# Patient Record
Sex: MALE | Race: OTHER | HISPANIC OR LATINO | Employment: UNEMPLOYED | URBAN - METROPOLITAN AREA
[De-identification: names, ages, dates, MRNs, and addresses within clinical notes are randomized per-mention and may not be internally consistent; named-entity substitution may affect disease eponyms.]

---

## 2018-10-16 ENCOUNTER — HOSPITAL ENCOUNTER (EMERGENCY)
Facility: HOSPITAL | Age: 33
Discharge: HOME | End: 2018-10-16
Attending: EMERGENCY MEDICINE | Admitting: EMERGENCY MEDICINE

## 2018-10-16 ENCOUNTER — APPOINTMENT (EMERGENCY)
Dept: RADIOLOGY | Facility: HOSPITAL | Age: 33
End: 2018-10-16

## 2018-10-16 VITALS
DIASTOLIC BLOOD PRESSURE: 65 MMHG | OXYGEN SATURATION: 99 % | TEMPERATURE: 98.1 F | WEIGHT: 278 LBS | RESPIRATION RATE: 22 BRPM | SYSTOLIC BLOOD PRESSURE: 144 MMHG | BODY MASS INDEX: 42.13 KG/M2 | HEART RATE: 91 BPM | HEIGHT: 68 IN

## 2018-10-16 DIAGNOSIS — R07.89 CHEST WALL PAIN: ICD-10-CM

## 2018-10-16 DIAGNOSIS — J40 BRONCHITIS: Primary | ICD-10-CM

## 2018-10-16 DIAGNOSIS — J45.909 ASTHMA: ICD-10-CM

## 2018-10-16 LAB
ALBUMIN SERPL BCP-MCNC: 4.4 G/DL (ref 3–5.2)
ALP SERPL-CCNC: 110 U/L (ref 43–122)
ALT SERPL W P-5'-P-CCNC: 31 U/L (ref 9–52)
ANION GAP SERPL CALCULATED.3IONS-SCNC: 7 MMOL/L (ref 5–14)
AST SERPL W P-5'-P-CCNC: 25 U/L (ref 17–59)
BASOPHILS # BLD AUTO: 0.11 THOUSAND/UL (ref 0–0.1)
BASOPHILS NFR MAR MANUAL: 1 % (ref 0–1)
BILIRUB SERPL-MCNC: 0.6 MG/DL
BUN SERPL-MCNC: 13 MG/DL (ref 5–25)
CALCIUM SERPL-MCNC: 9.3 MG/DL (ref 8.4–10.2)
CHLORIDE SERPL-SCNC: 105 MMOL/L (ref 97–108)
CO2 SERPL-SCNC: 26 MMOL/L (ref 22–30)
CREAT SERPL-MCNC: 0.99 MG/DL (ref 0.7–1.5)
EOSINOPHIL # BLD AUTO: 0.11 THOUSAND/UL (ref 0–0.4)
EOSINOPHIL NFR BLD MANUAL: 1 % (ref 0–6)
ERYTHROCYTE [DISTWIDTH] IN BLOOD BY AUTOMATED COUNT: 13.6 %
GFR SERPL CREATININE-BSD FRML MDRD: 99 ML/MIN/1.73SQ M
GLUCOSE SERPL-MCNC: 93 MG/DL (ref 70–99)
HCT VFR BLD AUTO: 44.8 % (ref 41–53)
HGB BLD-MCNC: 14.9 G/DL (ref 13.5–17.5)
LIPASE SERPL-CCNC: 58 U/L (ref 23–300)
LYMPHOCYTES # BLD AUTO: 3.89 THOUSAND/UL (ref 0.5–4)
LYMPHOCYTES # BLD AUTO: 35 % (ref 20–50)
MCH RBC QN AUTO: 28 PG (ref 26–34)
MCHC RBC AUTO-ENTMCNC: 33.2 G/DL (ref 31–36)
MCV RBC AUTO: 84 FL (ref 80–100)
MONOCYTES # BLD AUTO: 0.33 THOUSAND/UL (ref 0.2–0.9)
MONOCYTES NFR BLD AUTO: 3 % (ref 1–10)
NEUTS BAND NFR BLD MANUAL: 3 % (ref 0–8)
NEUTS SEG # BLD: 6.66 THOUSAND/UL (ref 1.8–7.8)
NEUTS SEG NFR BLD AUTO: 57 %
PLATELET # BLD AUTO: 235 THOUSANDS/UL (ref 150–450)
PLATELET BLD QL SMEAR: ADEQUATE
PMV BLD AUTO: 9.3 FL (ref 8.9–12.7)
POTASSIUM SERPL-SCNC: 4.4 MMOL/L (ref 3.6–5)
PROT SERPL-MCNC: 7.7 G/DL (ref 5.9–8.4)
RBC # BLD AUTO: 5.31 MILLION/UL (ref 4.5–5.9)
RBC MORPH BLD: NORMAL
SODIUM SERPL-SCNC: 138 MMOL/L (ref 137–147)
TOTAL CELLS COUNTED SPEC: 100
TROPONIN I SERPL-MCNC: <0.01 NG/ML (ref 0–0.03)
WBC # BLD AUTO: 11.1 THOUSAND/UL (ref 4.5–11)

## 2018-10-16 PROCEDURE — 84484 ASSAY OF TROPONIN QUANT: CPT | Performed by: PHYSICIAN ASSISTANT

## 2018-10-16 PROCEDURE — 94640 AIRWAY INHALATION TREATMENT: CPT

## 2018-10-16 PROCEDURE — 36415 COLL VENOUS BLD VENIPUNCTURE: CPT | Performed by: PHYSICIAN ASSISTANT

## 2018-10-16 PROCEDURE — 96361 HYDRATE IV INFUSION ADD-ON: CPT

## 2018-10-16 PROCEDURE — 96374 THER/PROPH/DIAG INJ IV PUSH: CPT

## 2018-10-16 PROCEDURE — 99285 EMERGENCY DEPT VISIT HI MDM: CPT

## 2018-10-16 PROCEDURE — 85007 BL SMEAR W/DIFF WBC COUNT: CPT | Performed by: PHYSICIAN ASSISTANT

## 2018-10-16 PROCEDURE — 80053 COMPREHEN METABOLIC PANEL: CPT | Performed by: PHYSICIAN ASSISTANT

## 2018-10-16 PROCEDURE — 83690 ASSAY OF LIPASE: CPT | Performed by: PHYSICIAN ASSISTANT

## 2018-10-16 PROCEDURE — 71045 X-RAY EXAM CHEST 1 VIEW: CPT

## 2018-10-16 PROCEDURE — 93005 ELECTROCARDIOGRAM TRACING: CPT

## 2018-10-16 PROCEDURE — 85027 COMPLETE CBC AUTOMATED: CPT | Performed by: PHYSICIAN ASSISTANT

## 2018-10-16 RX ORDER — BENZONATATE 100 MG/1
100 CAPSULE ORAL EVERY 8 HOURS
Qty: 21 CAPSULE | Refills: 0 | Status: SHIPPED | OUTPATIENT
Start: 2018-10-16 | End: 2019-01-21

## 2018-10-16 RX ORDER — ACETAMINOPHEN 325 MG/1
TABLET ORAL
Status: COMPLETED
Start: 2018-10-16 | End: 2018-10-16

## 2018-10-16 RX ORDER — ALBUTEROL SULFATE 2.5 MG/3ML
2.5 SOLUTION RESPIRATORY (INHALATION) ONCE
Status: DISCONTINUED | OUTPATIENT
Start: 2018-10-16 | End: 2018-10-16 | Stop reason: HOSPADM

## 2018-10-16 RX ORDER — ALBUTEROL SULFATE 90 UG/1
2 AEROSOL, METERED RESPIRATORY (INHALATION) EVERY 4 HOURS PRN
Qty: 1 INHALER | Refills: 0 | Status: SHIPPED | OUTPATIENT
Start: 2018-10-16

## 2018-10-16 RX ORDER — IBUPROFEN 600 MG/1
600 TABLET ORAL EVERY 6 HOURS PRN
Qty: 30 TABLET | Refills: 0 | Status: SHIPPED | OUTPATIENT
Start: 2018-10-16 | End: 2019-01-21

## 2018-10-16 RX ORDER — SODIUM CHLORIDE 9 MG/ML
250 INJECTION, SOLUTION INTRAVENOUS CONTINUOUS
Status: DISCONTINUED | OUTPATIENT
Start: 2018-10-16 | End: 2018-10-16 | Stop reason: HOSPADM

## 2018-10-16 RX ORDER — ALBUTEROL SULFATE 2.5 MG/3ML
2.5 SOLUTION RESPIRATORY (INHALATION) EVERY 6 HOURS PRN
Qty: 75 ML | Refills: 0 | Status: SHIPPED | OUTPATIENT
Start: 2018-10-16 | End: 2019-01-21

## 2018-10-16 RX ORDER — KETOROLAC TROMETHAMINE 30 MG/ML
30 INJECTION, SOLUTION INTRAMUSCULAR; INTRAVENOUS ONCE
Status: COMPLETED | OUTPATIENT
Start: 2018-10-16 | End: 2018-10-16

## 2018-10-16 RX ORDER — AZITHROMYCIN 250 MG/1
TABLET, FILM COATED ORAL
Qty: 6 TABLET | Refills: 0 | Status: SHIPPED | OUTPATIENT
Start: 2018-10-16 | End: 2018-10-20

## 2018-10-16 RX ORDER — ACETAMINOPHEN 325 MG/1
650 TABLET ORAL ONCE
Status: COMPLETED | OUTPATIENT
Start: 2018-10-16 | End: 2018-10-16

## 2018-10-16 RX ORDER — KETOROLAC TROMETHAMINE 30 MG/ML
INJECTION, SOLUTION INTRAMUSCULAR; INTRAVENOUS
Status: COMPLETED
Start: 2018-10-16 | End: 2018-10-16

## 2018-10-16 RX ADMIN — KETOROLAC TROMETHAMINE 30 MG: 30 INJECTION, SOLUTION INTRAMUSCULAR; INTRAVENOUS at 19:44

## 2018-10-16 RX ADMIN — ALBUTEROL SULFATE 2.5 MG: 2.5 SOLUTION RESPIRATORY (INHALATION) at 19:44

## 2018-10-16 RX ADMIN — ACETAMINOPHEN 650 MG: 325 TABLET ORAL at 19:44

## 2018-10-16 RX ADMIN — ALBUTEROL SULFATE 2.5 MG: 2.5 SOLUTION RESPIRATORY (INHALATION) at 20:24

## 2018-10-16 RX ADMIN — Medication 0.5 MG: at 19:44

## 2018-10-16 RX ADMIN — SODIUM CHLORIDE 250 ML/HR: 9 INJECTION, SOLUTION INTRAVENOUS at 19:43

## 2018-10-16 RX ADMIN — IPRATROPIUM BROMIDE 0.5 MG: 0.5 SOLUTION RESPIRATORY (INHALATION) at 19:44

## 2018-10-16 NOTE — ED PROVIDER NOTES
History  Chief Complaint   Patient presents with    Chest Pain     started today  pt c/o pain started midday and cough 2 hours ago  pt states he feels like he is starting to get congested  pt c/o SOB  Pain does not radiate  pt states he has had this pain before but had no problems with his heart       History provided by:  Patient   used: Yes    Medical Problem   Location:  Pt with cough and congestion and central chest pain since last josette   Severity:  Moderate  Onset quality:  Gradual  Duration:  1 day  Timing:  Constant  Progression:  Unchanged  Chronicity:  New  Associated symptoms: chest pain, congestion and cough    Associated symptoms: no abdominal pain, no diarrhea, no fatigue, no fever, no headaches, no loss of consciousness, no myalgias, no nausea, no rash, no rhinorrhea, no shortness of breath, no sore throat, no vomiting and no wheezing        None       History reviewed  No pertinent past medical history  Past Surgical History:   Procedure Laterality Date    APPENDECTOMY         History reviewed  No pertinent family history  I have reviewed and agree with the history as documented  Social History   Substance Use Topics    Smoking status: Never Smoker    Smokeless tobacco: Never Used    Alcohol use No        Review of Systems   Constitutional: Negative  Negative for fatigue and fever  HENT: Positive for congestion  Negative for rhinorrhea and sore throat  Eyes: Negative  Respiratory: Positive for cough  Negative for shortness of breath and wheezing  Cardiovascular: Positive for chest pain  Gastrointestinal: Negative  Negative for abdominal pain, diarrhea, nausea and vomiting  Endocrine: Negative  Genitourinary: Negative  Musculoskeletal: Negative  Negative for myalgias  Skin: Negative  Negative for rash  Allergic/Immunologic: Negative  Neurological: Negative  Negative for loss of consciousness and headaches  Hematological: Negative  Psychiatric/Behavioral: Negative  All other systems reviewed and are negative  Physical Exam  Physical Exam   Constitutional: He is oriented to person, place, and time  He appears well-developed and well-nourished  900pm  Pt states he feels much better  Increase airway cta no rrw    HENT:   Head: Normocephalic and atraumatic  Right Ear: External ear normal    Left Ear: External ear normal    Nose: Nose normal    Mouth/Throat: Oropharynx is clear and moist    Eyes: Pupils are equal, round, and reactive to light  Conjunctivae and EOM are normal    Neck: Normal range of motion  Neck supple  Cardiovascular: Normal rate, regular rhythm and normal heart sounds  Pulmonary/Chest: Effort normal    Decreased breath sounds  Sternal and parasternal tender to palp    Abdominal: Soft  Bowel sounds are normal    Musculoskeletal: Normal range of motion  Neurological: He is alert and oriented to person, place, and time  Skin: Skin is warm  Psychiatric: He has a normal mood and affect  His behavior is normal    Nursing note and vitals reviewed        Vital Signs  ED Triage Vitals [10/16/18 1914]   Temperature Pulse Respirations Blood Pressure SpO2   98 1 °F (36 7 °C) 91 22 144/65 99 %      Temp Source Heart Rate Source Patient Position - Orthostatic VS BP Location FiO2 (%)   Tympanic Monitor Sitting Left arm --      Pain Score       8           Vitals:    10/16/18 1914   BP: 144/65   Pulse: 91   Patient Position - Orthostatic VS: Sitting       Visual Acuity      ED Medications  Medications   sodium chloride 0 9 % infusion (0 mL/hr Intravenous Stopped 10/16/18 2036)   albuterol inhalation solution 2 5 mg (not administered)   albuterol inhalation solution 2 5 mg (not administered)   ipratropium (ATROVENT) 0 02 % inhalation solution 0 5 mg (0 5 mg Nebulization Given 10/16/18 1944)   ketorolac (TORADOL) injection 30 mg (30 mg Intravenous Given 10/16/18 1944)   acetaminophen (TYLENOL) tablet 650 mg (650 mg Oral Given 10/16/18 1944)   albuterol (5 mg/mL) 0 5 % inhalation solution **ADS Override Pull** (2 5 mg  Given 10/16/18 1944)   albuterol (5 mg/mL) 0 5 % inhalation solution **ADS Override Pull** (2 5 mg  Given 10/16/18 2024)       Diagnostic Studies  Results Reviewed     Procedure Component Value Units Date/Time    Troponin I [[id number]]  (Normal) Collected:  10/16/18 1944    Lab Status:  Final result Specimen:  Blood from Arm, Right Updated:  10/16/18 2029     Troponin I <0 01 ng/mL     Lipase [[id number]]  (Normal) Collected:  10/16/18 1944    Lab Status:  Final result Specimen:  Blood from Arm, Right Updated:  10/16/18 2018     Lipase 58 u/L     Comprehensive metabolic panel [[id number]]  (Normal) Collected:  10/16/18 1944    Lab Status:  Final result Specimen:  Blood from Arm, Right Updated:  10/16/18 2018     Sodium 138 mmol/L      Potassium 4 4 mmol/L      Chloride 105 mmol/L      CO2 26 mmol/L      ANION GAP 7 mmol/L      BUN 13 mg/dL      Creatinine 0 99 mg/dL      Glucose 93 mg/dL      Calcium 9 3 mg/dL      AST 25 U/L      ALT 31 U/L      Alkaline Phosphatase 110 U/L      Total Protein 7 7 g/dL      Albumin 4 4 g/dL      Total Bilirubin 0 60 mg/dL      eGFR 99 ml/min/1 73sq m     Narrative:         National Kidney Disease Education Program recommendations are as follows:  GFR calculation is accurate only with a steady state creatinine  Chronic Kidney disease less than 60 ml/min/1 73 sq  meters  Kidney failure less than 15 ml/min/1 73 sq  meters      CBC and differential [[id number]]  (Abnormal) Collected:  10/16/18 1944    Lab Status:  Final result Specimen:  Blood from Arm, Right Updated:  10/16/18 2009     WBC 11 10 (H) Thousand/uL      RBC 5 31 Million/uL      Hemoglobin 14 9 g/dL      Hematocrit 44 8 %      MCV 84 fL      MCH 28 0 pg      MCHC 33 2 g/dL      RDW 13 6 %      MPV 9 3 fL      Platelets 611 Thousands/uL                  XR chest 1 view portable    (Results Pending) Procedures  Procedures       Phone Contacts  ED Phone Contact    ED Course                               MDM  CritCare Time    Disposition  Final diagnoses:   Bronchitis   Asthma   Chest wall pain     Time reflects when diagnosis was documented in both MDM as applicable and the Disposition within this note     Time User Action Codes Description Comment    10/16/2018  9:04 PM Nuno Cruzyr Add [J40] Bronchitis     10/16/2018  9:04 PM Nuno Green Add [J45 909] Asthma     10/16/2018  9:04 PM Sarthak Monzon Add [R07 89] Chest wall pain       ED Disposition     ED Disposition Condition Comment    Discharge  [facility] discharge to home/self care      Condition at discharge: Good        Follow-up Information     Follow up With Specialties Details Why Contact Info Additional [address] [facility] Schedule an appointment as soon as possible for a visit  [address] [facility] [address] [phone] [address] [place] [address] [address], [address] [place], [place], [address]          Discharge Medication List as of 10/16/2018  9:06 PM      START taking these medications    Details   albuterol (2 5 mg/3 mL) 0 083 % nebulizer solution Take 1 vial (2 5 mg total) by nebulization every 6 (six) hours as needed for wheezing or shortness of breath, Starting Tue 10/16/2018, Print      albuterol (PROVENTIL HFA,VENTOLIN HFA) 90 mcg/act inhaler Inhale 2 puffs every 4 (four) hours as needed for wheezing, Starting Tue 10/16/2018, Print      azithromycin (ZITHROMAX Z-MONET) 250 mg tablet Take 2 tablets today then 1 tablet daily x 4 days, Print      benzonatate (TESSALON PERLES) 100 mg capsule Take 1 capsule (100 mg total) by mouth every 8 (eight) hours, Starting Tue 10/16/2018, Print      ibuprofen (MOTRIN) 600 mg tablet Take 1 tablet (600 mg total) by mouth every 6 (six) hours as needed (pain), Starting Tue 10/16/2018, Print           No discharge procedures on file      ED Provider  Electronically Signed by           Isela Garcia PA-C  10/16/18 8725

## 2018-10-17 LAB
ATRIAL RATE: 79 BPM
P AXIS: 61 DEGREES
PR INTERVAL: 142 MS
QRS AXIS: 6 DEGREES
QRSD INTERVAL: 88 MS
QT INTERVAL: 322 MS
QTC INTERVAL: 369 MS
T WAVE AXIS: 45 DEGREES
VENTRICULAR RATE: 79 BPM

## 2018-10-17 PROCEDURE — 93010 ELECTROCARDIOGRAM REPORT: CPT | Performed by: INTERNAL MEDICINE

## 2018-10-17 NOTE — DISCHARGE INSTRUCTIONS
Asma, cuidados ambulatorios   INFORMACIÓN GENERAL:   El asma  es joaquim enfermedad pulmonar que dificulta la respiración  La inflamación crónica y las reacciones a los factores desencadenantes estrechan las vías respiratorias en edgar pulmones  El asma puede ser mortal si no se controla  Los síntomas comunes incluyen los siguientes:   · Tos     · Sibilancias     · Falta de aliento     · Opresión en el pecho  Busque cuidados inmediatos para los siguientes síntomas:   · Falta de aliento severa    · Labios o uñas azules o grises    · La piel alrededor de zaidi samanta y costillas se hunde con cada respiro    · Falta de [place], aún después de tomarse edgar medicamentos de uso a corto plazo según edgar indicaciones    · Las lecturas numéricas del medidor de zaidi flujo bharat están en la saloni cindy de zaidi plan de acción para el asma  El tratamiento para el asma  dependerá de zaidi severidad  Es posible que los medicamentos disminuyan la inflamación, dana las vías respiratorias y faciliten zaidi respiración  Los medicamentos pueden inhalarse, tomarse en pastilla o [place]  Los [place] de uso a corto plazo alivian edgar síntomas rápidamente  Los medicamentos a kyle plazo se usan para prevenir ataques futuros  Puede ser que usted también necesite medicamentos para ayudar a controlar edgar alergias  Beryl Evangelist y prevenga futuros ataques de asma:   · Siga zaidi plan de acción para el asma  Terri es un plan escrito que usted y zaidi médico Prat[place]ha   Terri explica cual medicamento usted necesita y si es necesario, y cuando cambiar la dosis  También explica cómo controlar los síntomas y utilizar un medidor de flujo bharat (alto)  El medidor mide qué tan manuelito están funcionando los pulmones  · [address] , janet las [place], el reflujo estomacal y la apnea de sueño  · Identifique y evite los factores desencadenantes  Estos pueden [employer], los ácaros del [place], el moho y las cucarachas       · No fume y evite a los fumadores  Si usted fuma, nunca es tarde para dejar de hacerlo  Consulte con zaidi médico si necesita ayuda para dejar de fumar  · Consulte sobre la vacuna contra la gripe  La gripe puede empeorar zaidi asma  Es posible que necesite de joaquim vacuna contra la gripe cada año  Programe joaquim des de seguimiento con zaidi proveedor de kate según indicaciones:  Usted tendrá que regresar para asegurarse de que zaidi medicamento esté funcionando y edgar síntomas están controlados  Es probable que a usted lo refieran a un especialista en asma o alergias  Seguramente le pedirán que anote los valores numéricos de zaidi medidor del flujo bharat para que los lleve a edgar citas de seguimiento  Anote edgar preguntas para que las recuerde gustabo edgar citas de seguimiento  ACUERDOS SOBRE ZAIDI CUIDADO:   Usted tiene el derecho de participar en la planificación de zaidi cuidado  Aprenda todo lo que pueda sobre zaidi condición y janet darle tratamiento  Discuta con edgar médicos edgar opciones de tratamiento para juntos decidir el cuidado que usted quiere recibir  Usted siempre tiene el derecho a rechazar zaidi tratamiento  Esta información es sólo para uso en educación  Zaidi intención no es darle un consejo médico sobre enfermedades o tratamientos  Colsulte con zaidi Burlene Rison farmacéutico antes de seguir cualquier régimen médico para saber si es seguro y efectivo para usted  © 2014 [address] is for End User's use only and may not be sold, redistributed or otherwise used for commercial purposes  All illustrations and images included in CareNotes® are the copyrighted property of A D A M , Inc  or Zev Rivera  Bronquitis aguda   CUIDADO AMBULATORIO:   La bronquitis aguda  es la inflamación e irritación de edgar vías respiratorias  Esta irritación puede provocar que tosa o que tenga otros problemas de respiración  La bronquitis aguda suele comenzar debido a otra enfermedad, janet un resfriado o la gripe  La enfermedad se propaga de means nariz y garganta a means tráquea y Megne November  La bronquitis a menudo es conocida janet resfriado de pecho  La bronquitis aguda generalmente dura alrededor de 3 a 6 semanas y no es joaquim enfermedad grave  La tos podría durar por varias semanas  Usted podría presentar cualquiera de los siguientes síntomas:   · Tos con esputo que podría ser Chucho Hansa, Mylinda Battiest o alice    · Sentirse más cansado de lo normal y yon corporales    · [place] y escalofríos    · Resuello cuando respira    · Opresión en el pecho o dolor cuando respira o tose  Busque atención médica de inmediato si:   · Usted expectora ann  · Edgar labios o uñas de los dedos se ponen azules  · Usted siente que no está recibiendo suficiente aire cuando respira  Pregúntele a means Sheeba Boateng vitaminas y minerales son adecuados para usted  · Usted tiene fiebre  · Edgar problemas respiratorios no desaparecen o empeoran  · Means tos no mejora dentro de 4 semanas  · Usted tiene preguntas o inquietudes acerca de means condición o cuidado  Cuidados personales:   · Descanse más  El descanso ayuda a means cuerpo a sanar  Empiece a hacer un poco más día a día  Descanse cuando usted sienta que es necesario  · Evite irritantes en el aire  Evite productos químicos, gases y polvo  Use joaquim mascarilla si tiene que trabajar alrededor de polvo o gases  Permanezca dentro cuando los niveles de contaminación hetal altos  Si tiene alergias, permanezca adentro cuando el conteo de polen sea [place]AN[place]  No use productos en aerosol, janet desodorante, aerosol contra los insectos y aerosol para el yolis  · No fume o esté alrededor de personas que fuman  La nicotina y otros químicos en los cigarrillos y cigarros dañan la cilia que saca la mucosidad de edgar pulmones  Pida información a means médico si usted actualmente fuma y necesita ayuda para dejar de fumar  Los cigarrillos electrónicos o tabaco sin humo todavía contienen nicotina   Consulte con means médico antes de [employer]  · [address] se le haya indicado  Los líquidos ayudan a mantener humectadas edgar vías respiratorias y ayudarle a eliminar las flemas por medio de la tos  Es posible que usted necesite curtis más líquidos si tiene bronquitis [place]  Pregunte cuánto líquido debe curtis cada día y cuáles líquidos son los más adecuados para usted  · Use un humidificador o vaporizador  Use un humidificador de ferny frío o un vaporizador para elevar la humedad en zaidi casa  [place] podría ayudarle a respirar más fácilmente y al mismo tiempo disminuir la tos  Evite la bronquitis aguda haciendo lo siguiente:   · Vaya a que le pongan las vacunas necesarias  Pregúntele a zaidi médico si usted debería ser vacunado contra la gripe o la neumonía  · Evite la propagación de gérmenes  Usted puede disminuir zaidi riesgo de bronquitis [place] y otras enfermedades de la siguiente manera:     ¨ [place] frecuentemente con agua y [place]  Lleve joaquim loción o gel antiséptico para las debra  Usted puede usar la loción o el gel para limpiar edgar debra cuando no haya agua disponible  ¨ No se toque los ojos, la nariz o la boca a menos que se haya lavado las debra osman  ¨ Siempre cubra zaidi boca al toser para evitar la propagación de gérmenes  Lo mejor es toser en un pañuelo desechable o en la manga de zaidi camisa, en lugar de en zaidi mano  [place] a los que le rodean que cubran edgar bocas al toser  ¨ Trate de evitar a las personas que están resfriadas o tienen gripe  Si usted está enfermo, manténgase alejado de otras personas lo más que pueda  Medicamentos:  Zaidi médico podría  darle cualquiera de lo siguiente:  · El ibuprofeno o el acetaminofeno  son medicamentos que pueden ayudar a bajar zaidi fiebre  Están disponibles sin receta médica  Consulte con zaidi médico cuál medicamento es el adecuado para usted  Pregunte la cantidad y la frecuencia con que debe tomarlos  [address]   Estos medicamentos pueden provocar sangrado estomacal si no se neida correctamente  El ibuprofeno puede provocar daño al Aren Seals  No tome ibuprofeno si usted tiene enfermedad de los riñones, [place] o si es alérgico a la aspirina  El acetaminofeno puede dañar el hígado  No tome más de 4,000 miligramos en 24 horas  · Los descongestionantes  ayudan a despejar la mucosidad en edgar pulmones y que sea más fácil expectorarla  [place] puede ayudarle a respirar mejor  · Los jarabes para la tos  disminuyen zaidi necesidad de toser  Si zaidi tos produce mucosidad, no tome un supresor de la tos a menos que zaidi médico se lo indique  Zaidi médico podría sugerirle que tome un supresor de la tos en la noche para que pueda descansar  · Inhaladores  podrían ser Hershal Simmer  Zaidi médico podría darle graham o más inhaladores para ayudarle a respirar más fácil y Len Cumber menos tos  Un inhalador le administra medicamento para abrir edgar vías aéreas  Pídale a zaidi médico que le muestre cómo usar zaidi inhalador correctamente  Acuda a edgar consultas de control con zaidi médico según le indicaron  Escriba las preguntas que tenga para que recuerde hacerlas gustabo edgar citas de seguimiento  © 2017 [address] [facility] Information is for End User's use only and may not be sold, redistributed or otherwise used for commercial purposes  All illustrations and images included in CareNotes® are the copyrighted property of A D A M , Inc  or Zev Rivera  Esta información es sólo para uso en educación  Zaidi intención no es darle un consejo médico sobre enfermedades o tratamientos  Colsulte con zaidi [place] farmacéutico antes de seguir cualquier régimen médico para saber si es seguro y efectivo para usted  Dolor de la pared torácica, cuidados ambulatorios   INFORMACIÓN GENERAL:   El dolor de la pared torácica  puede ser causado por problemas con los músculos, cartílago o huesos de la pared torácica   El dolor de la pared torácica también puede ser causado por dolor que se propaga a zaidi pecho y que proviene de [place] parte de zaidi cuerpo  El dolor puede ser persistente, severo, leve o yashira  Puede que venga y vaya o puede que sea [place]  El dolor también puede ser peor cuando usted se mueve de ciertas formas, respira profundo o tose  Busque cuidados inmediatos para los siguientes síntomas:   · Dolor severo    · Usted tiene cualquiera de los siguientes signos de un ataque cardíaco:      ¨ Estornudos, presión, o dolor en zaidi pecho que dura mas de 5 minutos o regresa  ¨ Incomodidad o dolor en zaidi espalda, samanta, mandíbula, estómago, o brazos  ¨ Tiene dificultad para respirar  ¨ Náusea o vómitos  ¨ Se siente muy desvanecido o tiene sudores fríos especialmente en el pecho o dificultad para respirar  El tratamiento  depende de la causa de zaidi dolor de la pared torácica  Es probable que usted necesite alguno de los siguientes:  · Los antiiflamatorios no esteroideos (AINEs) ayudan a reducir inflamación y dolor o fiebre  Terri medicamento esta disponible con o sin joaquim receta médica  Los AINEs podrían causar sangrado estomacal o problemas en los riñones en [employer]  Si usted esta tomando un anticoágulante, siempre  pregunte a zaidi proveedor de kate si los AINEs son seguros para usted  Antes de [employer], markus siempre la etiqueta de información y siga edgar indicaciones  · El acetaminofén  disminuye el dolor y está disponible sin receta médica  Pregunte cuánto curtis y con cuánta frecuencia  [address]  el acetaminofén puede causar daño al hígado si no se greg correctamente  · Aplique calor  a zaidi pecho por 20 a 30 minutos cada 2 horas por tantos días janet le indiquen  El calor ayuda a disminuir el dolor y los espasmos musculares  · Aplique hielo  a zaidi pecho por 15 a 20 minutos cada hora según indicaciones  Use joaquim compresa de hielo o ponga hielo triturado en joaquim bolsa de plástico  Mani Moons con joaquim toalla   El hielo ayuda a prevenir daños a los tejidos y [employer] inflamación y el dolor  Programe joaquim des con baltazar proveedor de [employer] se le haya indicado: Anote edgar preguntas para que se acuerde de hacerlas gustabo edgar visitas  ACUERDOS SOBRE BALTAZAR CUIDADO:   Usted tiene el derecho de participar en la planificación de baltazar cuidado  Aprenda todo lo que pueda sobre baltazar condición y janet darle tratamiento  Discuta con edgar médicos edgar opciones de tratamiento para juntos decidir el cuidado que usted quiere recibir  Usted siempre tiene el derecho a rechazar baltazar tratamiento  Esta información es sólo para uso en educación  Baltazar intención no es darle un consejo médico sobre enfermedades o tratamientos  Colsulte con baltazar [place] farmacéutico antes de seguir cualquier régimen médico para saber si es seguro y efectivo para usted  © 2014 [address] is for End User's use only and may not be sold, redistributed or otherwise used for commercial purposes  All illustrations and images included in CareNotes® are the copyrighted property of A D A M , Inc  or Zev Rivera

## 2019-01-21 ENCOUNTER — HOSPITAL ENCOUNTER (EMERGENCY)
Facility: HOSPITAL | Age: 34
Discharge: HOME | End: 2019-01-21
Attending: EMERGENCY MEDICINE | Admitting: EMERGENCY MEDICINE
Payer: COMMERCIAL

## 2019-01-21 ENCOUNTER — APPOINTMENT (EMERGENCY)
Dept: RADIOLOGY | Facility: HOSPITAL | Age: 34
End: 2019-01-21
Payer: COMMERCIAL

## 2019-01-21 VITALS
WEIGHT: 280.3 LBS | BODY MASS INDEX: 42.48 KG/M2 | HEART RATE: 84 BPM | SYSTOLIC BLOOD PRESSURE: 131 MMHG | HEIGHT: 68 IN | OXYGEN SATURATION: 98 % | DIASTOLIC BLOOD PRESSURE: 81 MMHG | RESPIRATION RATE: 20 BRPM | TEMPERATURE: 97.1 F

## 2019-01-21 DIAGNOSIS — J40 BRONCHITIS: Primary | ICD-10-CM

## 2019-01-21 DIAGNOSIS — J45.909 ASTHMA: ICD-10-CM

## 2019-01-21 PROCEDURE — 71046 X-RAY EXAM CHEST 2 VIEWS: CPT

## 2019-01-21 PROCEDURE — 94640 AIRWAY INHALATION TREATMENT: CPT

## 2019-01-21 PROCEDURE — 99283 EMERGENCY DEPT VISIT LOW MDM: CPT

## 2019-01-21 RX ORDER — PREDNISONE 20 MG/1
60 TABLET ORAL ONCE
Status: COMPLETED | OUTPATIENT
Start: 2019-01-21 | End: 2019-01-21

## 2019-01-21 RX ORDER — ALBUTEROL SULFATE 2.5 MG/3ML
2.5 SOLUTION RESPIRATORY (INHALATION) EVERY 6 HOURS PRN
Qty: 75 ML | Refills: 0 | Status: SHIPPED | OUTPATIENT
Start: 2019-01-21

## 2019-01-21 RX ORDER — AZITHROMYCIN 250 MG/1
TABLET, FILM COATED ORAL
Qty: 4 TABLET | Refills: 0 | Status: SHIPPED | OUTPATIENT
Start: 2019-01-21 | End: 2019-01-25

## 2019-01-21 RX ORDER — ALBUTEROL SULFATE 2.5 MG/3ML
2.5 SOLUTION RESPIRATORY (INHALATION) ONCE
Status: COMPLETED | OUTPATIENT
Start: 2019-01-21 | End: 2019-01-21

## 2019-01-21 RX ORDER — PREDNISONE 10 MG/1
TABLET ORAL
Qty: 30 TABLET | Refills: 0 | Status: SHIPPED | OUTPATIENT
Start: 2019-01-21

## 2019-01-21 RX ORDER — AZITHROMYCIN 250 MG/1
500 TABLET, FILM COATED ORAL ONCE
Status: COMPLETED | OUTPATIENT
Start: 2019-01-21 | End: 2019-01-21

## 2019-01-21 RX ADMIN — IPRATROPIUM BROMIDE 0.5 MG: 0.5 SOLUTION RESPIRATORY (INHALATION) at 21:08

## 2019-01-21 RX ADMIN — ALBUTEROL SULFATE 2.5 MG: 2.5 SOLUTION RESPIRATORY (INHALATION) at 21:08

## 2019-01-21 RX ADMIN — ALBUTEROL SULFATE 2.5 MG: 2.5 SOLUTION RESPIRATORY (INHALATION) at 21:41

## 2019-01-21 RX ADMIN — PREDNISONE 60 MG: 20 TABLET ORAL at 21:57

## 2019-01-21 RX ADMIN — AZITHROMYCIN 500 MG: 250 TABLET, FILM COATED ORAL at 21:57

## 2019-01-22 NOTE — DISCHARGE INSTRUCTIONS
Bronquitis aguda   CUIDADO AMBULATORIO:   La bronquitis aguda  es la inflamación e irritación de frida vías respiratorias  Esta irritación puede provocar que tosa o que tenga otros problemas de respiración  La bronquitis aguda suele comenzar debido a otra enfermedad, janet un resfriado o la gripe  La enfermedad se propaga de zaidi nariz y garganta a zaidi tráquea y Carol Mercury  La bronquitis a menudo es conocida janet resfriado de pecho  La bronquitis aguda generalmente dura alrededor de 3 a 6 semanas y no es joaquim enfermedad grave  La tos podría durar por varias semanas  Usted podría presentar cualquiera de los siguientes síntomas:   · Tos con esputo que podría ser Lynette Dunks, Ramses Amari o alice    · Sentirse más cansado de lo normal y yon corporales    · Fatuma Khushboo y escalofríos    · Resuello cuando respira    · Opresión en el pecho o dolor cuando respira o tose  Busque atención médica de inmediato si:   · Usted expectora ann  · Frida labios o uñas de los dedos se ponen azules  · Usted siente que no está recibiendo suficiente aire cuando respira  Pregúntele a zaidi [employer] vitaminas y minerales son adecuados para usted  · Usted tiene fiebre  · Frida problemas respiratorios no desaparecen o empeoran  · Zaidi tos no mejora dentro de 4 semanas  · Usted tiene preguntas o inquietudes acerca de zaidi condición o cuidado  Cuidados personales:   · Descanse más  El descanso ayuda a zaidi cuerpo a sanar  Empiece a hacer un poco más día a día  Descanse cuando usted sienta que es necesario  · Evite irritantes en el aire  Evite productos químicos, gases y polvo  Use joaquim mascarilla si tiene que trabajar alrededor de polvo o gases  Permanezca dentro cuando los niveles de contaminación hetal altos  Si tiene alergias, permanezca adentro cuando el conteo de polen sea CROSSCANONBY  No use productos en aerosol, janet desodorante, aerosol contra los insectos y aerosol para el yolis  · No fume o esté alrededor de personas que fuman    [place] nicotina y otros químicos en los cigarrillos y cigarros dañan la cilia que saca la mucosidad de edgar pulmones  Pida información a zaidi médico si usted actualmente fuma y necesita ayuda para dejar de fumar  Los cigarrillos electrónicos o tabaco sin humo todavía contienen nicotina  Consulte con zaidi médico antes de [employer]  · [address] se le haya indicado  Los líquidos ayudan a mantener humectadas edgar vías respiratorias y ayudarle a eliminar las flemas por medio de la tos  Es posible que usted necesite curtis más líquidos si tiene bronquitis [place]  Pregunte cuánto líquido debe curtis cada día y cuáles líquidos son los más adecuados para usted  · Use un humidificador o vaporizador  Use un humidificador de ferny frío o un vaporizador para elevar la humedad en zaidi casa  [place] podría ayudarle a respirar más fácilmente y al mismo tiempo disminuir la tos  Evite la bronquitis aguda haciendo lo siguiente:   · Vaya a que le pongan las vacunas necesarias  Pregúntele a zaidi médico si usted debería ser vacunado contra la gripe o la neumonía  · Evite la propagación de gérmenes  Usted puede disminuir zaidi riesgo de bronquitis [place] y otras enfermedades de la siguiente manera:     ¨ [employer] frecuentemente con agua y [place]  Lleve joaquim loción o gel antiséptico para las debra  Usted puede usar la loción o el gel para limpiar edgar debra cuando no haya agua disponible  ¨ No se toque los ojos, la nariz o la boca a menos que se haya lavado las debra osman  ¨ Siempre cubra zaidi boca al toser para evitar la propagación de gérmenes  Lo mejor es toser en un pañuelo desechable o en la manga de zaidi camisa, en lugar de en zaidi mano  [place] a los que le rodean que cubran edgar bocas al toser  ¨ Trate de evitar a las personas que están resfriadas o tienen gripe  Si usted está enfermo, manténgase alejado de otras personas lo más que pueda    Medicamentos:  Zaidi médico podría  darle cualquiera de lo siguiente:  · El ibuprofeno o el acetaminofeno  son medicamentos que pueden ayudar a bajar zaidi fiebre  Están disponibles sin receta médica  Consulte con zaidi médico cuál medicamento es el adecuado para usted  Pregunte la cantidad y la frecuencia con que debe tomarlos  [address]  Estos medicamentos pueden provocar sangrado estomacal si no se neida correctamente  El ibuprofeno puede provocar daño al [place]  No tome ibuprofeno si usted tiene enfermedad de los riñones, Deja o si es alérgico a la aspirina  El acetaminofeno puede dañar el hígado  No tome más de 4,000 miligramos en 24 horas  · Los descongestionantes  ayudan a despejar la mucosidad en edgar pulmones y que sea más fácil expectorarla  [employer] puede ayudarle a respirar mejor  · Los jarabes para la tos  disminuyen zaidi necesidad de toser  Si zaidi tos produce mucosidad, no tome un supresor de la tos a menos que zaidi médico se lo indique  Zaidi médico podría sugerirle que tome un supresor de la tos en la noche para que pueda descansar  · Inhaladores  podrían ser Bernis Shackleton  Zaidi médico podría darle graham o más inhaladores para ayudarle a respirar más fácil y Mercedes Sill menos tos  Un inhalador le administra medicamento para abrir edgar vías aéreas  [place]dale a zaidi médico que le muestre cómo usar zaidi inhalador correctamente  Acuda a edgar consultas de control con zaidi médico según le indicaron  Escriba las preguntas que tenga para que recuerde hacerlas gustabo edgar citas de seguimiento  © 2017 [address] Information is for End User's use only and may not be sold, redistributed or otherwise used for commercial purposes  All illustrations and images included in CareNotes® are the copyrighted property of A D A M , Inc  or Zev Nicole  Esta información es sólo para uso en educación  Zaidi intención no es darle un consejo médico sobre enfermedades o tratamientos   Colsulte con zaidi [place] farmacéutico antes de seguir cualquier régimen médico para saber si es seguro y efectivo para usted  5 tabs po qd x 2 days then 4 tabs po qd x 2 days then 3 tabs po qd x 2 days then 2 tabs po qd x 2 days then 1 tab po qd x 2 days   Asma, cuidados ambulatorios   INFORMACIÓN GENERAL:   El asma  es joaquim enfermedad pulmonar que dificulta la respiración  La inflamación crónica y las reacciones a los factores desencadenantes estrechan las vías respiratorias en edgar pulmones  El asma puede ser mortal si no se controla  Los síntomas comunes incluyen los siguientes:   · Tos     · Sibilancias     · Falta de aliento     · Opresión en el pecho  Busque cuidados inmediatos para los siguientes síntomas:   · Falta de aliento severa    · Labios o uñas azules o grises    · La piel alrededor de zaidi samanta y costillas se hunde con cada respiro    · Falta de [place], aún después de tomarse edgar medicamentos de uso a corto plazo según edgar indicaciones    · Las lecturas numéricas del medidor de zaidi flujo bharat están en la saloni cindy de zaidi plan de acción para el asma  El tratamiento para el asma  dependerá de zaidi severidad  Es posible que los medicamentos disminuyan la inflamación, dana las vías respiratorias y faciliten zaidi respiración  Los medicamentos pueden inhalarse, tomarse en pastilla o [place]  Los [employer] de uso a corto plazo alivian edgar síntomas rápidamente  Los medicamentos a kyle plazo se usan para prevenir ataques futuros  Puede ser que usted también necesite medicamentos para ayudar a controlar edgar alergias  Chevis Fulling y prevenga futuros ataques de asma:   · Siga zaidi plan de acción para el asma  Terri es un plan escrito que usted y zaidi médico Otila Shouts  Terri explica cual medicamento usted necesita y si es necesario, y cuando cambiar la dosis  También explica cómo controlar los síntomas y utilizar un medidor de flujo bharat (alto)  El medidor mide qué tan manuelito están funcionando los pulmones      · [employer] otras afecciones de kate , janet las [place] reflujo estomacal y la apnea de sueño  · Identifique y evite los factores desencadenantes  Estos pueden [employer], los ácaros del [place], el moho y las cucarachas  · No fume y evite a los fumadores  Si usted fuma, nunca es tarde para dejar de hacerlo  Consulte con zaidi médico si necesita ayuda para dejar de fumar  · Consulte sobre la vacuna contra la gripe  La gripe puede empeorar zaidi asma  Es posible que necesite de joaquim vacuna contra la gripe cada año  Programe joaquim des de seguimiento con zaidi proveedor de kate según indicaciones:  Usted tendrá que regresar para asegurarse de que zaidi medicamento esté funcionando y edgar síntomas están controlados  Es probable que a usted lo refieran a un especialista en asma o alergias  Seguramente le pedirán que anote los valores numéricos de zaidi medidor del flujo bharat para que los lleve a edgar citas de seguimiento  Anote edgar preguntas para que las recuerde gustabo edgar citas de seguimiento  ACUERDOS SOBRE ZAIDI CUIDADO:   Usted tiene el derecho de participar en la planificación de zaidi cuidado  Aprenda todo lo que pueda sobre zaidi condición y janet darle tratamiento  Discuta con edgar médicos edgar opciones de tratamiento para juntos decidir el cuidado que usted quiere recibir  Usted siempre tiene el derecho a rechazar zaidi tratamiento  Esta información es sólo para uso en educación  Zaidi intención no es darle un consejo médico sobre enfermedades o tratamientos  Colsulte con zaidi Maribel Angst farmacéutico antes de seguir cualquier régimen médico para saber si es seguro y efectivo para usted  © [address] is for End User's use only and may not be sold, redistributed or otherwise used for commercial purposes  All illustrations and images included in CareNotes® are the copyrighted property of A D A M , Inc  or Zev Rivera

## 2019-01-22 NOTE — ED PROVIDER NOTES
History  Chief Complaint   Patient presents with    Sore Throat     patient states that he has a sore throat started today, denies fever, has cough       History provided by:  Patient   used: No    Medical Problem   Location:  Pt with cough and asthma for 1 days   albuterol inhaler not helping   Severity:  Mild  Onset quality:  Gradual  Duration:  1 day  Timing:  Constant  Progression:  Unchanged  Chronicity:  New  Associated symptoms: cough    Associated symptoms: no abdominal pain, no chest pain, no congestion, no diarrhea, no ear pain, no fatigue, no fever, no headaches, no loss of consciousness, no myalgias, no nausea, no rash, no rhinorrhea, no shortness of breath, no sore throat, no vomiting and no wheezing        Prior to Admission Medications   Prescriptions Last Dose Informant Patient Reported? Taking? albuterol (PROVENTIL HFA,VENTOLIN HFA) 90 mcg/act inhaler   No No   Sig: Inhale 2 puffs every 4 (four) hours as needed for wheezing      Facility-Administered Medications: None       History reviewed  No pertinent past medical history  Past Surgical History:   Procedure Laterality Date    APPENDECTOMY         History reviewed  No pertinent family history  I have reviewed and agree with the history as documented  Social History   Substance Use Topics    Smoking status: Never Smoker    Smokeless tobacco: Never Used    Alcohol use No        Review of Systems   Constitutional: Negative  Negative for fatigue and fever  HENT: Negative  Negative for congestion, ear pain, rhinorrhea and sore throat  Eyes: Negative  Respiratory: Positive for cough  Negative for apnea, shortness of breath and wheezing  Cardiovascular: Negative  Negative for chest pain  Gastrointestinal: Negative  Negative for abdominal pain, diarrhea, nausea and vomiting  Endocrine: Negative  Genitourinary: Negative  Musculoskeletal: Negative  Negative for myalgias  Skin: Negative    Negative for rash  Allergic/Immunologic: Negative  Neurological: Negative  Negative for loss of consciousness and headaches  Hematological: Negative  Psychiatric/Behavioral: Negative  All other systems reviewed and are negative  Physical Exam  Physical Exam   Constitutional: He is oriented to person, place, and time  He appears well-developed and well-nourished  Post nebs cta increase airway pt feeling much better    HENT:   Head: Normocephalic and atraumatic  Right Ear: External ear normal    Left Ear: External ear normal    Nose: Nose normal    Mouth/Throat: Oropharynx is clear and moist    Eyes: Pupils are equal, round, and reactive to light  Conjunctivae and EOM are normal    Neck: Normal range of motion  Neck supple  Cardiovascular: Normal rate, regular rhythm and normal heart sounds  Pulmonary/Chest: Effort normal    Decreased breath sounds  Coarse sounds all fields    Abdominal: Soft  Bowel sounds are normal    Musculoskeletal: Normal range of motion  Neurological: He is alert and oriented to person, place, and time  Skin: Skin is warm  Nursing note and vitals reviewed        Vital Signs  ED Triage Vitals [01/21/19 2058]   Temperature Pulse Respirations Blood Pressure SpO2   (!) 97 1 °F (36 2 °C) 84 20 131/81 98 %      Temp Source Heart Rate Source Patient Position - Orthostatic VS BP Location FiO2 (%)   Tympanic Monitor Sitting Left arm --      Pain Score       --           Vitals:    01/21/19 2058   BP: 131/81   Pulse: 84   Patient Position - Orthostatic VS: Sitting       Visual Acuity      ED Medications  Medications   albuterol inhalation solution 2 5 mg (2 5 mg Nebulization Given 1/21/19 2108)   ipratropium (ATROVENT) 0 02 % inhalation solution 0 5 mg (0 5 mg Nebulization Given 1/21/19 2108)   albuterol inhalation solution 2 5 mg (2 5 mg Nebulization Given 1/21/19 2141)   azithromycin (ZITHROMAX) tablet 500 mg (500 mg Oral Given 1/21/19 2157)   predniSONE tablet 60 mg (60 mg Oral Given 1/21/19 2157)       Diagnostic Studies  Results Reviewed     None                 XR chest 2 views   Final Result by Tona Alva MD (01/21 2125)      No acute cardiopulmonary disease  Workstation performed: [id number]                    Procedures  Procedures       Phone Contacts  ED Phone Contact    ED Course                               MDM  CritCare Time    Disposition  Final diagnoses:   Bronchitis   Asthma     Time reflects when diagnosis was documented in both MDM as applicable and the Disposition within this note     Time User Action Codes Description Comment    1/21/2019  9:49 PM Evelina Brigido  Add [J40] Bronchitis     1/21/2019  9:49 PM Ricarda Kate  Add [J45 909] Asthma       ED Disposition     ED Disposition Condition Comment    Discharge  [facility] discharge to home/self care  Condition at discharge: Good        Follow-up Information     Follow up With Specialties Details Why Cathleen Valera MD Family Medicine Schedule an appointment as soon as possible for a visit  [address] [facility] [address]             Patient's Medications   Discharge Prescriptions    ALBUTEROL (2 5 MG/3 ML) 0 083 % NEBULIZER SOLUTION    Take 1 vial (2 5 mg total) by nebulization every 6 (six) hours as needed for wheezing or shortness of breath       Start Date: 1/21/2019 End Date: --       Order Dose: 2 5 mg       Quantity: 75 mL    Refills: 0    AZITHROMYCIN (ZITHROMAX Z-MONET) 250 MG TABLET    1 tablet daily x 4 days       Start Date: 1/21/2019 End Date: 1/25/2019       Order Dose: --       Quantity: 4 tablet    Refills: 0    PREDNISONE 10 MG TABLET    5 tabs po qd x 2 days then 4 tabs po qd x 2 days then 3 tabs po qd x 2 days then 2 tabs po qd x 2 days then 1 tab po qd x 2 days       Start Date: 1/21/2019 End Date: --       Order Dose: --       Quantity: 30 tablet    Refills: 0     No discharge procedures on file      ED Provider  Electronically Signed by           David Buckner PA-C  01/21/19 1739

## 2021-02-20 ENCOUNTER — HOSPITAL ENCOUNTER (EMERGENCY)
Facility: HOSPITAL | Age: 36
Discharge: HOME | End: 2021-02-20
Attending: EMERGENCY MEDICINE

## 2021-02-20 ENCOUNTER — APPOINTMENT (EMERGENCY)
Dept: CT IMAGING | Facility: HOSPITAL | Age: 36
End: 2021-02-20

## 2021-02-20 VITALS
OXYGEN SATURATION: 98 % | TEMPERATURE: 98.5 F | RESPIRATION RATE: 18 BRPM | BODY MASS INDEX: 45.16 KG/M2 | DIASTOLIC BLOOD PRESSURE: 78 MMHG | WEIGHT: 297 LBS | SYSTOLIC BLOOD PRESSURE: 145 MMHG | HEART RATE: 90 BPM
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
## 2021-02-20 DIAGNOSIS — M79.81 HEMATOMA, NONTRAUMATIC, SOFT TISSUE: Primary | ICD-10-CM

## 2021-02-20 LAB
ANION GAP SERPL CALCULATED.3IONS-SCNC: 7 MMOL/L (ref 5–14)
BASOPHILS # BLD AUTO: 0.1 THOUSANDS/ΜL (ref 0–0.1)
BASOPHILS NFR BLD AUTO: 1 % (ref 0–1)
BUN SERPL-MCNC: 15 MG/DL (ref 5–25)
CALCIUM SERPL-MCNC: 9.5 MG/DL (ref 8.4–10.2)
CHLORIDE SERPL-SCNC: 104 MMOL/L (ref 97–108)
CO2 SERPL-SCNC: 28 MMOL/L (ref 22–30)
CREAT SERPL-MCNC: 0.95 MG/DL (ref 0.7–1.5)
EOSINOPHIL # BLD AUTO: 0.2 THOUSAND/ΜL (ref 0–0.4)
EOSINOPHIL NFR BLD AUTO: 2 % (ref 0–6)
ERYTHROCYTE [DISTWIDTH] IN BLOOD BY AUTOMATED COUNT: 13.1 %
GFR SERPL CREATININE-BSD FRML MDRD: 103 ML/MIN/1.73SQ M
GLUCOSE SERPL-MCNC: 99 MG/DL (ref 70–99)
HCT VFR BLD AUTO: 40.9 % (ref 41–53)
HGB BLD-MCNC: 13.3 G/DL (ref 13.5–17.5)
LYMPHOCYTES # BLD AUTO: 3.3 THOUSANDS/ΜL (ref 0.5–4)
LYMPHOCYTES NFR BLD AUTO: 31 % (ref 25–45)
MCH RBC QN AUTO: 27.3 PG (ref 26–34)
MCHC RBC AUTO-ENTMCNC: 32.6 G/DL (ref 31–36)
MCV RBC AUTO: 84 FL (ref 80–100)
MONOCYTES # BLD AUTO: 0.9 THOUSAND/ΜL (ref 0.2–0.9)
MONOCYTES NFR BLD AUTO: 9 % (ref 1–10)
NEUTROPHILS # BLD AUTO: 6.2 THOUSANDS/ΜL (ref 1.8–7.8)
NEUTS SEG NFR BLD AUTO: 58 % (ref 45–65)
PLATELET # BLD AUTO: 252 THOUSANDS/UL (ref 150–450)
PMV BLD AUTO: 9.2 FL (ref 8.9–12.7)
POTASSIUM SERPL-SCNC: 4.2 MMOL/L (ref 3.6–5)
RBC # BLD AUTO: 4.88 MILLION/UL (ref 4.5–5.9)
SODIUM SERPL-SCNC: 139 MMOL/L (ref 137–147)
WBC # BLD AUTO: 10.7 THOUSAND/UL (ref 4.5–11)

## 2021-02-20 PROCEDURE — 74177 CT ABD & PELVIS W/CONTRAST: CPT

## 2021-02-20 PROCEDURE — 80048 BASIC METABOLIC PNL TOTAL CA: CPT | Performed by: PHYSICIAN ASSISTANT

## 2021-02-20 PROCEDURE — 99284 EMERGENCY DEPT VISIT MOD MDM: CPT

## 2021-02-20 PROCEDURE — 85025 COMPLETE CBC W/AUTO DIFF WBC: CPT | Performed by: PHYSICIAN ASSISTANT

## 2021-02-20 PROCEDURE — 36415 COLL VENOUS BLD VENIPUNCTURE: CPT | Performed by: PHYSICIAN ASSISTANT

## 2021-02-20 PROCEDURE — 96374 THER/PROPH/DIAG INJ IV PUSH: CPT

## 2021-02-20 PROCEDURE — 99285 EMERGENCY DEPT VISIT HI MDM: CPT | Performed by: PHYSICIAN ASSISTANT

## 2021-02-20 PROCEDURE — G1004 CDSM NDSC: HCPCS

## 2021-02-20 RX ORDER — MORPHINE SULFATE 4 MG/ML
4 INJECTION, SOLUTION INTRAMUSCULAR; INTRAVENOUS ONCE
Status: COMPLETED | OUTPATIENT
Start: 2021-02-20 | End: 2021-02-20

## 2021-02-20 RX ORDER — HYDROCODONE BITARTRATE AND ACETAMINOPHEN 5; 325 MG/1; MG/1
1 TABLET ORAL EVERY 6 HOURS PRN
Qty: 5 TABLET | Refills: 0 | Status: SHIPPED | OUTPATIENT
Start: 2021-02-20

## 2021-02-20 RX ADMIN — IOHEXOL 100 ML: 350 INJECTION, SOLUTION INTRAVENOUS at 20:00

## 2021-02-20 RX ADMIN — MORPHINE SULFATE 4 MG: 4 INJECTION INTRAVENOUS at 19:10

## 2021-02-20 NOTE — ED PROVIDER NOTES
History  Chief Complaint   Patient presents with    Fall     slipped on ice 2 days ago, landed on right buttocks and right arm   very large amount of dark bruising to same, c/o worsening pain        39-year-old male with past medical history of asthma presenting for evaluation after fall  Patient states he was getting out of his truck 2 days ago when he slipped and fell on the ice  Patient reports he fell about 4 ft hitting the right hip and buttock on the curb/sidewalk area  Reports since that time he has been attempting to use ibuprofen without relief  Patient reports increasing pain and swelling in the right buttock  No difficulty walking  No dysuria or hematuria  No saddle anesthesia  Prior to Admission Medications   Prescriptions Last Dose Informant Patient Reported? Taking? albuterol (2 5 mg/3 mL) 0 083 % nebulizer solution   No No   Sig: Take 1 vial (2 5 mg total) by nebulization every 6 (six) hours as needed for wheezing or shortness of breath   albuterol (PROVENTIL HFA,VENTOLIN HFA) 90 mcg/act inhaler   No No   Sig: Inhale 2 puffs every 4 (four) hours as needed for wheezing   predniSONE 10 mg tablet   No No   Si tabs po qd x 2 days then 4 tabs po qd x 2 days then 3 tabs po qd x 2 days then 2 tabs po qd x 2 days then 1 tab po qd x 2 days      Facility-Administered Medications: None       Past Medical History:   Diagnosis Date    Asthma        Past Surgical History:   Procedure Laterality Date    APPENDECTOMY         History reviewed  No pertinent family history  I have reviewed and agree with the history as documented  E-Cigarette/Vaping     E-Cigarette/Vaping Substances     Social History     Tobacco Use    Smoking status: Former Smoker    Smokeless tobacco: Never Used   Substance Use Topics    Alcohol use: No    Drug use: Not Currently     Types: Marijuana       Review of Systems   All other systems reviewed and are negative        Physical Exam  Physical Exam  Vitals signs and nursing note reviewed  Constitutional:       General: He is not in acute distress  Appearance: Normal appearance  He is well-developed  He is obese  He is not ill-appearing, toxic-appearing or diaphoretic  HENT:      Head: Normocephalic and atraumatic  Eyes:      Conjunctiva/sclera: Conjunctivae normal       Comments: EOM grossly intact   Neck:      Musculoskeletal: Normal range of motion and neck supple  Vascular: No JVD  Cardiovascular:      Rate and Rhythm: Normal rate  Pulmonary:      Effort: Pulmonary effort is normal  No respiratory distress  Breath sounds: No wheezing  Abdominal:      Palpations: Abdomen is soft  Musculoskeletal:      Comments: FROM, ambulating to the room from triage without difficulty, steady gait, cap refill brisk, strength and sensation grossly intact throughout   Skin:     General: Skin is warm and dry  Capillary Refill: Capillary refill takes less than 2 seconds  Neurological:      Mental Status: He is alert and oriented to person, place, and time     Psychiatric:         Behavior: Behavior normal          Vital Signs  ED Triage Vitals   Temperature Pulse Respirations Blood Pressure SpO2   02/20/21 1829 02/20/21 1829 02/20/21 1829 02/20/21 1829 02/20/21 1829   98 5 °F (36 9 °C) 90 18 145/78 98 %      Temp Source Heart Rate Source Patient Position - Orthostatic VS BP Location FiO2 (%)   02/20/21 1829 02/20/21 1829 02/20/21 1829 02/20/21 1829 --   Tympanic Monitor Sitting Left arm       Pain Score       02/20/21 1910       8           Vitals:    02/20/21 1829   BP: 145/78   Pulse: 90   Patient Position - Orthostatic VS: Sitting         Visual Acuity      ED Medications  Medications   morphine (PF) 4 mg/mL injection 4 mg (4 mg Intravenous Given 2/20/21 1910)   iohexol (OMNIPAQUE) 350 MG/ML injection (MULTI-DOSE) 100 mL (100 mL Intravenous Given 2/20/21 2000)       Diagnostic Studies  Results Reviewed     Procedure Component Value Units Date/Time Basic metabolic panel [[id number]]  (Normal) Collected: 02/20/21 1911    Lab Status: Final result Specimen: Blood from Arm, Left Updated: 02/20/21 1932     Sodium 139 mmol/L      Potassium 4 2 mmol/L      Chloride 104 mmol/L      CO2 28 mmol/L      ANION GAP 7 mmol/L      BUN 15 mg/dL      Creatinine 0 95 mg/dL      Glucose 99 mg/dL      Calcium 9 5 mg/dL      eGFR 103 ml/min/1 73sq m     Narrative:      [place] guidelines for Chronic Kidney Disease (CKD):     Stage 1 with normal or high GFR (GFR > 90 mL/min/1 73 square meters)    Stage 2 Mild CKD (GFR = 60-89 mL/min/1 73 square meters)    Stage 3A Moderate CKD (GFR = 45-59 mL/min/1 73 square meters)    Stage 3B Moderate CKD (GFR = 30-44 mL/min/1 73 square meters)    Stage 4 Severe CKD (GFR = 15-29 mL/min/1 73 square meters)    Stage 5 End Stage CKD (GFR <15 mL/min/1 73 square meters)  Note: GFR calculation is accurate only with a steady state creatinine    CBC and differential [[id number]]  (Abnormal) Collected: 02/20/21 1911    Lab Status: Final result Specimen: Blood from Arm, Left Updated: 02/20/21 1920     WBC 10 70 Thousand/uL      RBC 4 88 Million/uL      Hemoglobin 13 3 g/dL      Hematocrit 40 9 %      MCV 84 fL      MCH 27 3 pg      MCHC 32 6 g/dL      RDW 13 1 %      MPV 9 2 fL      Platelets 493 Thousands/uL      Neutrophils Relative 58 %      Lymphocytes Relative 31 %      Monocytes Relative 9 %      Eosinophils Relative 2 %      Basophils Relative 1 %      Neutrophils Absolute 6 20 Thousands/µL      Lymphocytes Absolute 3 30 Thousands/µL      Monocytes Absolute 0 90 Thousand/µL      Eosinophils Absolute 0 20 Thousand/µL      Basophils Absolute 0 10 Thousands/µL                  CT abdomen pelvis with contrast   Final Result by Wally Armendariz MD (02/20 2023)         1  Acute appearing 3 9 x 10 9 x 13 3 right gluteal subcutaneous hematoma with no definite involvement of the underlying gluteal musculature  No fracture  2  Additional findings as noted  Workstation performed: [id number]                    Procedures  Procedures         ED Course  ED Course as of Feb 20 2032   Sat Feb 20, 2021 2029 IMPRESSION:        1  Acute appearing 3 9 x 10 9 x 13 3 right gluteal subcutaneous hematoma with no definite involvement of the underlying gluteal musculature  No fracture  2   Additional findings as noted                                    SBIRT 20yo+      Most Recent Value   SBIRT (24 yo +)   In order to provide better care to our patients, we are screening all of our patients for alcohol and drug use  Would it be okay to ask you these screening questions? Yes Filed at: 02/20/2021 1892   Initial Alcohol Screen: US AUDIT-C    1  How often do you have a drink containing alcohol?  0 Filed at: 02/20/2021 1835   2  How many drinks containing alcohol do you have on a typical day you are drinking? 0 Filed at: 02/20/2021 1835   3a  Male UNDER 65: How often do you have five or more drinks on one occasion? 0 Filed at: 02/20/2021 1835   Audit-C Score  0 Filed at: 02/20/2021 6372   JEN: How many times in the past year have you    Used an illegal drug or used a prescription medication for non-medical reasons?   Never Filed at: 02/20/2021 1835                    MDM  Number of Diagnoses or Management Options  Hematoma, nontraumatic, soft tissue:   Diagnosis management comments:  50-year-old male presenting for evaluation after slip and fall on ice off of his truck, patient has large superficial hematoma on the right buttock, CT abdomen pelvis was completed in order to rule out any intra-abdominal hemorrhage or further injury from the fall, CT shows only superficial gluteal hematoma, no bleeding into the musculature, advised warm compress, and pain medication, pt requesting print out of pain meds as he does not have insurance so he needs to see where the meds will be cheapest, he is able to ambulate without difficulty, no red flag symptoms or lower back pain, f/u with pcp as an outpatient    All labs and imaging discussed with patient, strict return to ED precautions discussed  Pt verbalizes understanding and agrees with plan  Pt is stable for discharge    Portions of the record may have been created with voice recognition software  Occasional wrong word or "sound a like" substitutions may have occurred due to the inherent limitations of voice recognition software  Read the chart carefully and recognize, using context, where substitutions have occurred  Disposition  Final diagnoses:   Hematoma, nontraumatic, soft tissue   Fall, initial encounter     Time reflects when diagnosis was documented in both MDM as applicable and the Disposition within this note     Time User Action Codes Description Comment    2/20/2021  8:26 PM Lyubov Ferreira Add [M79 81] Hematoma, nontraumatic, soft tissue     2/20/2021  8:31 PM Lyubov Ferreira Add [B18  Ana Filkarlie, initial encounter       ED Disposition     ED Disposition Condition Date/Time Comment    Discharge Stable Sat Feb 20, 2021  8:26 PM Jeremy Carias discharge to home/self care              Follow-up Information     Follow up With Specialties Details Why Contact Info Additional [address] [facility] Family Medicine Call in 1 day  [address] [facility] [address], [address] [facility] [address]  [address], [address] [facility] [address] [facility] [address], [address], [place] [address] [facility] Department Emergency Medicine Go to  If symptoms worsen [address] [id number] [address] [facility] Heart Emergency Department          Patient's Medications   Discharge Prescriptions    HYDROCODONE-ACETAMINOPHEN (NORCO) 5-325 MG PER TABLET    Take 1 tablet by mouth every 6 (six) hours as needed for painMax Daily Amount: 4 tablets Start Date: 2/20/2021 End Date: --       Order Dose: 1 tablet       Quantity: 5 tablet    Refills: 0     No discharge procedures on file      PDMP Review     None          ED Provider  Electronically Signed by           Clary Tanner PA-C  02/20/21 2032

## 2021-02-20 NOTE — Clinical Note
Kayden Granger was seen and treated in our emergency department on 2/20/2021  Diagnosis:     sEme Arellano  may return to work on return date  He may return on this date: 02/23/2021         If you have any questions or concerns, please don't hesitate to call        Hilary Do PA-C    ______________________________           _______________          _______________  Hospital Representative                              Date                                Time

## 2022-04-09 ENCOUNTER — APPOINTMENT (EMERGENCY)
Dept: CT IMAGING | Facility: HOSPITAL | Age: 37
End: 2022-04-09

## 2022-04-09 ENCOUNTER — HOSPITAL ENCOUNTER (EMERGENCY)
Facility: HOSPITAL | Age: 37
Discharge: HOME | End: 2022-04-09
Attending: EMERGENCY MEDICINE

## 2022-04-09 VITALS
DIASTOLIC BLOOD PRESSURE: 67 MMHG | HEART RATE: 96 BPM | RESPIRATION RATE: 18 BRPM | BODY MASS INDEX: 42.38 KG/M2 | TEMPERATURE: 99.4 F | SYSTOLIC BLOOD PRESSURE: 121 MMHG | OXYGEN SATURATION: 98 % | WEIGHT: 278.7 LBS

## 2022-04-09 DIAGNOSIS — R11.2 NAUSEA AND VOMITING, UNSPECIFIED VOMITING TYPE: Primary | ICD-10-CM

## 2022-04-09 LAB
BACTERIA UR QL AUTO: ABNORMAL /HPF
BILIRUB UR QL STRIP: NEGATIVE
CLARITY UR: CLEAR
COLOR UR: YELLOW
GLUCOSE UR STRIP-MCNC: NEGATIVE MG/DL
HGB UR QL STRIP.AUTO: 10
KETONES UR STRIP-MCNC: ABNORMAL MG/DL
LEUKOCYTE ESTERASE UR QL STRIP: NEGATIVE
MUCOUS THREADS UR QL AUTO: ABNORMAL
NITRITE UR QL STRIP: NEGATIVE
NON-SQ EPI CELLS URNS QL MICRO: ABNORMAL /HPF
PH UR STRIP.AUTO: 6 [PH]
PROT UR STRIP-MCNC: ABNORMAL MG/DL
RBC #/AREA URNS AUTO: ABNORMAL /HPF
SP GR UR STRIP.AUTO: 1.02 (ref 1–1.04)
UROBILINOGEN UA: NEGATIVE MG/DL
WBC #/AREA URNS AUTO: ABNORMAL /HPF

## 2022-04-09 PROCEDURE — 81001 URINALYSIS AUTO W/SCOPE: CPT | Performed by: EMERGENCY MEDICINE

## 2022-04-09 PROCEDURE — 99284 EMERGENCY DEPT VISIT MOD MDM: CPT | Performed by: EMERGENCY MEDICINE

## 2022-04-09 PROCEDURE — G1004 CDSM NDSC: HCPCS

## 2022-04-09 PROCEDURE — 99284 EMERGENCY DEPT VISIT MOD MDM: CPT

## 2022-04-09 PROCEDURE — 74176 CT ABD & PELVIS W/O CONTRAST: CPT

## 2022-04-09 RX ORDER — MAGNESIUM HYDROXIDE/ALUMINUM HYDROXICE/SIMETHICONE 120; 1200; 1200 MG/30ML; MG/30ML; MG/30ML
30 SUSPENSION ORAL ONCE
Status: COMPLETED | OUTPATIENT
Start: 2022-04-09 | End: 2022-04-09

## 2022-04-09 RX ORDER — IBUPROFEN 600 MG/1
600 TABLET ORAL ONCE
Status: COMPLETED | OUTPATIENT
Start: 2022-04-09 | End: 2022-04-09

## 2022-04-09 RX ORDER — ONDANSETRON 4 MG/1
4 TABLET, ORALLY DISINTEGRATING ORAL ONCE
Status: COMPLETED | OUTPATIENT
Start: 2022-04-09 | End: 2022-04-09

## 2022-04-09 RX ORDER — LIDOCAINE HYDROCHLORIDE 20 MG/ML
15 SOLUTION OROPHARYNGEAL ONCE
Status: COMPLETED | OUTPATIENT
Start: 2022-04-09 | End: 2022-04-09

## 2022-04-09 RX ORDER — ONDANSETRON 4 MG/1
4 TABLET, FILM COATED ORAL EVERY 6 HOURS
Qty: 12 TABLET | Refills: 0 | Status: SHIPPED | OUTPATIENT
Start: 2022-04-09

## 2022-04-09 RX ADMIN — LIDOCAINE HYDROCHLORIDE 15 ML: 20 SOLUTION ORAL; TOPICAL at 19:43

## 2022-04-09 RX ADMIN — ONDANSETRON 4 MG: 4 TABLET, ORALLY DISINTEGRATING ORAL at 19:18

## 2022-04-09 RX ADMIN — ALUMINUM HYDROXIDE, MAGNESIUM HYDROXIDE, AND SIMETHICONE 30 ML: 200; 200; 20 SUSPENSION ORAL at 19:42

## 2022-04-09 RX ADMIN — IBUPROFEN 600 MG: 600 TABLET ORAL at 19:18

## 2022-04-09 NOTE — ED TRIAGE NOTES
Patient reports nausea, vomiting and lower back pain that began this morning  States he has vomited 6x and has been unable to keep meals down but is tolerating fluids  States lower back pain is a 10/10 burning pain

## 2022-04-09 NOTE — Clinical Note
Austen Gunter was seen and treated in our emergency department on 4/9/2022  Diagnosis:     Regina Callahan  may return to work on return date  He may return on this date: 04/11/2022         If you have any questions or concerns, please don't hesitate to call        Mian Byers MD    ______________________________           _______________          _______________  Hospital Representative                              Date                                Time

## 2022-04-10 NOTE — ED PROVIDER NOTES
History  Chief Complaint   Patient presents with    Vomiting    Back Pain     59-year-old male presented emergency room with 1 day of nausea vomiting low back pain  Denies diarrhea  Denies cough congestion chest pain or shortness of breath  History provided by:  Patient  Vomiting  Severity:  Mild  Duration:  8 hours  Timing:  Intermittent  Quality:  Stomach contents  Progression:  Unchanged  Chronicity:  New  Recent urination:  Normal  Relieved by:  Nothing  Worsened by:  Nothing  Ineffective treatments:  None tried  Associated symptoms: no abdominal pain, no arthralgias, no chills, no cough, no fever and no sore throat        Prior to Admission Medications   Prescriptions Last Dose Informant Patient Reported? Taking? HYDROcodone-acetaminophen (NORCO) 5-325 mg per tablet   No No   Sig: Take 1 tablet by mouth every 6 (six) hours as needed for painMax Daily Amount: 4 tablets   albuterol (2 5 mg/3 mL) 0 083 % nebulizer solution   No No   Sig: Take 1 vial (2 5 mg total) by nebulization every 6 (six) hours as needed for wheezing or shortness of breath   albuterol (PROVENTIL HFA,VENTOLIN HFA) 90 mcg/act inhaler   No No   Sig: Inhale 2 puffs every 4 (four) hours as needed for wheezing   predniSONE 10 mg tablet   No No   Si tabs po qd x 2 days then 4 tabs po qd x 2 days then 3 tabs po qd x 2 days then 2 tabs po qd x 2 days then 1 tab po qd x 2 days      Facility-Administered Medications: None       Past Medical History:   Diagnosis Date    Asthma        Past Surgical History:   Procedure Laterality Date    APPENDECTOMY         History reviewed  No pertinent family history  I have reviewed and agree with the history as documented      E-Cigarette/Vaping     E-Cigarette/Vaping Substances     Social History     Tobacco Use    Smoking status: Former Smoker    Smokeless tobacco: Never Used   Substance Use Topics    Alcohol use: No    Drug use: Not Currently     Types: Marijuana       Review of Systems Constitutional: Negative for chills and fever  HENT: Negative for ear pain and sore throat  Eyes: Negative for pain and visual disturbance  Respiratory: Negative for cough and shortness of breath  Cardiovascular: Negative for chest pain and palpitations  Gastrointestinal: Positive for vomiting  Negative for abdominal pain  Genitourinary: Negative for dysuria and hematuria  Musculoskeletal: Positive for back pain  Negative for arthralgias  Skin: Negative for color change and rash  Neurological: Negative for seizures and syncope  All other systems reviewed and are negative  Physical Exam  Physical Exam  Vitals and nursing note reviewed  Constitutional:       Appearance: He is well-developed  HENT:      Head: Normocephalic and atraumatic  Nose: Nose normal       Mouth/Throat:      Mouth: Mucous membranes are moist    Eyes:      Conjunctiva/sclera: Conjunctivae normal    Cardiovascular:      Rate and Rhythm: Normal rate and regular rhythm  Heart sounds: No murmur heard  Pulmonary:      Effort: Pulmonary effort is normal  No respiratory distress  Breath sounds: Normal breath sounds  Abdominal:      General: Abdomen is flat  Palpations: Abdomen is soft  Tenderness: There is no abdominal tenderness  There is no right CVA tenderness or left CVA tenderness  Musculoskeletal:      Cervical back: Neck supple  Skin:     General: Skin is warm and dry  Capillary Refill: Capillary refill takes less than 2 seconds  Neurological:      Mental Status: He is alert           Vital Signs  ED Triage Vitals [04/09/22 1859]   Temperature Pulse Respirations Blood Pressure SpO2   99 4 °F (37 4 °C) (!) 117 18 163/88 98 %      Temp Source Heart Rate Source Patient Position - Orthostatic VS BP Location FiO2 (%)   Oral Monitor Sitting Left arm --      Pain Score       10 - Worst Possible Pain           Vitals:    04/09/22 1859 04/09/22 1923 04/09/22 1926   BP: 163/88 121/67   Pulse: (!) 117 96    Patient Position - Orthostatic VS: Sitting  Lying         Visual Acuity      ED Medications  Medications   ondansetron (ZOFRAN-ODT) dispersible tablet 4 mg (4 mg Oral Given 4/9/22 1918)   ibuprofen (MOTRIN) tablet 600 mg (600 mg Oral Given 4/9/22 1918)   aluminum-magnesium hydroxide-simethicone (MYLANTA) oral suspension 30 mL (30 mL Oral Given 4/9/22 1942)   Lidocaine Viscous HCl (XYLOCAINE) 2 % mucosal solution 15 mL (15 mL Swish & Swallow Given 4/9/22 1943)       Diagnostic Studies  Results Reviewed     Procedure Component Value Units Date/Time    Urinalysis with microscopic [[id number]]  (Abnormal) Collected: 04/09/22 1957    Lab Status: Final result Specimen: Urine, Clean Catch Updated: 04/09/22 2027     Clarity, UA Clear     Color, UA Yellow     Specific Gravity, UA 1 025     pH, UA 6 0     Glucose, UA Negative mg/dl      Ketones, UA 15 (1+) mg/dl      Blood, UA 10 0     Protein, UA 15 (Trace) mg/dl      Nitrite, UA Negative     Bilirubin, UA Negative     Leukocytes, UA Negative     WBC, UA 2-4 /hpf      RBC, UA 0-1 /hpf      Bacteria, UA Moderate /hpf      Epithelial Cells Occasional /hpf      MUCUS THREADS Moderate     UROBILINOGEN UA Negative mg/dL                  CT abdomen pelvis wo contrast   Final Result by Maureen Roche MD (04/09 2211)      No evidence of obstructive uropathy  Workstation performed: [id number]                    Procedures  Procedures         ED Course                                             MDM  Number of Diagnoses or Management Options  Nausea and vomiting, unspecified vomiting type  Diagnosis management comments: Urine showed blood so CT abdomen pelvis was performed to rule out nephrolithiasis  No kidney stone found  Likely gastroenteritis  Will discharge        Disposition  Final diagnoses:   Nausea and vomiting, unspecified vomiting type     Time reflects when diagnosis was documented in both MDM as applicable and the Disposition within this note     Time User Action Codes Description Comment    4/9/2022 10:13 PM Mine Darden Add [R11 2] Nausea and vomiting, unspecified vomiting type       ED Disposition     ED Disposition Condition Date/Time Comment    Discharge Stable Sat Apr 9, 2022 10:13 PM Leodan Contreras discharge to home/self care  Follow-up Information     Follow up With Specialties Details Why [address] [facility] 2nd Floor Family Medicine In 7 days  [address] [facility] [address]  [phone]            Patient's Medications   Discharge Prescriptions    ONDANSETRON (ZOFRAN) 4 MG TABLET    Take 1 tablet (4 mg total) by mouth every 6 (six) hours       Start Date: 4/9/2022  End Date: --       Order Dose: 4 mg       Quantity: 12 tablet    Refills: 0       No discharge procedures on file      PDMP Review     None          ED Provider  Electronically Signed by           Melquiades Moore MD  04/09/22 7609

## 2022-07-23 ENCOUNTER — HOSPITAL ENCOUNTER (EMERGENCY)
Facility: HOSPITAL | Age: 37
Discharge: HOME | End: 2022-07-23
Attending: EMERGENCY MEDICINE

## 2022-07-23 VITALS
HEART RATE: 79 BPM | DIASTOLIC BLOOD PRESSURE: 72 MMHG | RESPIRATION RATE: 20 BRPM | TEMPERATURE: 98 F | OXYGEN SATURATION: 97 % | SYSTOLIC BLOOD PRESSURE: 119 MMHG | WEIGHT: 272.71 LBS

## 2022-07-23 DIAGNOSIS — L03.90 CELLULITIS: Primary | ICD-10-CM

## 2022-07-23 LAB — GLUCOSE SERPL-MCNC: 112 MG/DL (ref 65–140)

## 2022-07-23 PROCEDURE — 99284 EMERGENCY DEPT VISIT MOD MDM: CPT | Performed by: PHYSICIAN ASSISTANT

## 2022-07-23 PROCEDURE — 99283 EMERGENCY DEPT VISIT LOW MDM: CPT

## 2022-07-23 PROCEDURE — 82948 REAGENT STRIP/BLOOD GLUCOSE: CPT

## 2022-07-23 RX ORDER — CEPHALEXIN 500 MG/1
500 CAPSULE ORAL EVERY 8 HOURS SCHEDULED
Qty: 30 CAPSULE | Refills: 0 | Status: SHIPPED | OUTPATIENT
Start: 2022-07-23 | End: 2022-08-02

## 2022-07-23 RX ORDER — SULFAMETHOXAZOLE AND TRIMETHOPRIM 800; 160 MG/1; MG/1
1 TABLET ORAL ONCE
Status: COMPLETED | OUTPATIENT
Start: 2022-07-23 | End: 2022-07-23

## 2022-07-23 RX ORDER — CEPHALEXIN 500 MG/1
500 CAPSULE ORAL ONCE
Status: COMPLETED | OUTPATIENT
Start: 2022-07-23 | End: 2022-07-23

## 2022-07-23 RX ORDER — SULFAMETHOXAZOLE AND TRIMETHOPRIM 800; 160 MG/1; MG/1
1 TABLET ORAL EVERY 12 HOURS SCHEDULED
Qty: 20 TABLET | Refills: 0 | Status: SHIPPED | OUTPATIENT
Start: 2022-07-23 | End: 2022-08-02

## 2022-07-23 RX ADMIN — CEPHALEXIN 500 MG: 500 CAPSULE ORAL at 12:50

## 2022-07-23 RX ADMIN — SULFAMETHOXAZOLE AND TRIMETHOPRIM 1 TABLET: 800; 160 TABLET ORAL at 12:50

## 2022-07-23 NOTE — Clinical Note
José Thomas was seen and treated in our emergency department on 7/23/2022  No restrictions            Diagnosis:     Liss  may return to work on return date  He may return on this date: 07/24/2022         If you have any questions or concerns, please don't hesitate to call        Nacny Jackson PA-C    ______________________________           _______________          _______________  Hospital Representative                              Date                                Time

## 2022-07-23 NOTE — ED PROVIDER NOTES
History  Chief Complaint   Patient presents with    Abscess     Patient c/o abscess on his testicle, no drainage     Pt with 2 days of lump to scrotum       Rash  Location: groin  Quality: redness    Severity:  Mild  Onset quality:  Gradual  Duration:  2 days  Timing:  Constant  Progression:  Worsening  Chronicity:  New  Context: not animal contact    Relieved by:  Nothing  Worsened by:  Nothing  Ineffective treatments:  None tried  Associated symptoms: no abdominal pain        None       History reviewed  No pertinent past medical history  History reviewed  No pertinent surgical history  History reviewed  No pertinent family history  I have reviewed and agree with the history as documented  E-Cigarette/Vaping     E-Cigarette/Vaping Substances     Social History     Tobacco Use    Smoking status: Never Smoker    Smokeless tobacco: Never Used   Substance Use Topics    Alcohol use: Yes     Comment: socially    Drug use: Yes     Types: Marijuana       Review of Systems   Constitutional: Negative  HENT: Negative  Eyes: Negative  Respiratory: Negative  Cardiovascular: Negative  Gastrointestinal: Negative  Negative for abdominal pain  Endocrine: Negative  Genitourinary: Negative  Negative for difficulty urinating  Musculoskeletal: Negative  Skin: Positive for rash and wound  Allergic/Immunologic: Negative  Neurological: Negative  Hematological: Negative  Psychiatric/Behavioral: Negative  All other systems reviewed and are negative  Physical Exam  Physical Exam  Vitals and nursing note reviewed  Constitutional:       Appearance: Normal appearance  He is normal weight  Comments: Discussed with pt the concern of infection in this area, pt declines further evaluation, declines iv fluids meds  Ct scan and or ultrasound   Pt wants pills will return if worse will sign ama form    HENT:      Head: Normocephalic and atraumatic        Right Ear: Tympanic membrane, ear canal and external ear normal       Left Ear: Tympanic membrane, ear canal and external ear normal       Nose: Nose normal       Mouth/Throat:      Mouth: Mucous membranes are moist       Pharynx: Oropharynx is clear  Eyes:      Extraocular Movements: Extraocular movements intact  Conjunctiva/sclera: Conjunctivae normal       Pupils: Pupils are equal, round, and reactive to light  Cardiovascular:      Rate and Rhythm: Normal rate and regular rhythm  Pulses: Normal pulses  Heart sounds: Normal heart sounds  Pulmonary:      Effort: Pulmonary effort is normal       Breath sounds: Normal breath sounds  Abdominal:      General: Abdomen is flat  Bowel sounds are normal       Palpations: Abdomen is soft  Genitourinary:     Penis: Normal        Testes: Normal       Rectum: Normal       Comments: Right perineal marble size slight erythema and swelling not fluctuant     Perianal wnl no pain no swelling   Musculoskeletal:         General: Normal range of motion  Cervical back: Normal range of motion and neck supple  Skin:     General: Skin is warm  Capillary Refill: Capillary refill takes less than 2 seconds  Neurological:      General: No focal deficit present  Mental Status: He is alert and oriented to person, place, and time     Psychiatric:         Mood and Affect: Mood normal          Behavior: Behavior normal          Vital Signs  ED Triage Vitals [07/23/22 1151]   Temperature Pulse Respirations Blood Pressure SpO2   98 °F (36 7 °C) 79 20 119/72 97 %      Temp Source Heart Rate Source Patient Position - Orthostatic VS BP Location FiO2 (%)   Oral Monitor Sitting Left arm --      Pain Score       --           Vitals:    07/23/22 1151   BP: 119/72   Pulse: 79   Patient Position - Orthostatic VS: Sitting         Visual Acuity      ED Medications  Medications   sulfamethoxazole-trimethoprim (BACTRIM DS) 800-160 mg per tablet 1 tablet (1 tablet Oral Given 7/23/22 1250) cephalexin (KEFLEX) capsule 500 mg (500 mg Oral Given 7/23/22 1250)       Diagnostic Studies  Results Reviewed     Procedure Component Value Units Date/Time    Fingerstick Glucose (POCT) [[id number]]  (Normal) Collected: 07/23/22 1225    Lab Status: Final result Updated: 07/23/22 1226     POC Glucose 112 mg/dl                  No orders to display              Procedures  Procedures         ED Course                               SBIRT 22yo+    Flowsheet Row Most Recent Value   SBIRT (23 yo +)    In order to provide better care to our patients, we are screening all of our patients for alcohol and drug use  Would it be okay to ask you these screening questions? Unable to answer at this time Filed at: 07/23/2022 1224                    MDM    Disposition  Final diagnoses:   Cellulitis     Time reflects when diagnosis was documented in both MDM as applicable and the Disposition within this note     Time User Action Codes Description Comment    7/23/2022 12:45 PM Resukhdeep Height  Add [L03 90] Cellulitis       ED Disposition     ED Disposition   AMA    Condition   --    Date/Time   Sat Jul 23, 2022 12:49 PM    Comment   Date: 7/23/2022  Patient: Gilford Him  Admitted: 7/23/2022 12:10 PM  Attending Provider: Quentin Islam, DO Gilford Florencio or his authorized caregiver has made the decision for the patient to leave the emergency departmen t against the advice of his attending physician  He or his authorized caregiver has been informed and understands the inherent risks, including death, disability    He or his authorized caregiver has decided to accept the responsibility for this dec ision Gilford Him and all necessary parties have been advised that he may return for further evaluation or treatment  His condition at time of discharge was disability    Gilford Him had current vital signs as follows:  BP 1 19/72 (BP Location: Left arm)   Pulse 79   Temp 98 °F (36 7 °C) (Oral)   Resp 20   Wt 124 kg (272 lb 11 3 oz)            Follow-up Information     Follow up With Specialties Details Why Contact Info    Martin Ashraf MD Family Medicine  return if condition worsens [address]  [phone]            Discharge Medication List as of 7/23/2022 12:46 PM      START taking these medications    Details   cephalexin (KEFLEX) 500 mg capsule Take 1 capsule (500 mg total) by mouth every 8 (eight) hours for 10 days, Starting Sat 7/23/2022, Until Tue 8/2/2022, Print      sulfamethoxazole-trimethoprim (BACTRIM DS) 800-160 mg per tablet Take 1 tablet by mouth every 12 (twelve) hours for 10 days, Starting Sat 7/23/2022, Until Tue 8/2/2022, Print             No discharge procedures on file      PDMP Review     None          ED Provider  Electronically Signed by           Ralph Yepez PA-C  07/23/22 4701

## 2022-07-23 NOTE — Clinical Note
Tania Bocanegra was seen and treated in our emergency department on 7/23/2022  No restrictions            Diagnosis:     Liss  may return to work on return date  He may return on this date: 07/24/2022         If you have any questions or concerns, please don't hesitate to call        Saira Vernon PA-C    ______________________________           _______________          _______________  Hospital Representative                              Date                                Time

## 2022-09-06 ENCOUNTER — HOSPITAL ENCOUNTER (EMERGENCY)
Facility: HOSPITAL | Age: 37
Discharge: HOME | End: 2022-09-06
Attending: EMERGENCY MEDICINE

## 2022-09-06 VITALS
HEIGHT: 69 IN | DIASTOLIC BLOOD PRESSURE: 71 MMHG | HEART RATE: 84 BPM | BODY MASS INDEX: 40.59 KG/M2 | RESPIRATION RATE: 16 BRPM | SYSTOLIC BLOOD PRESSURE: 144 MMHG | WEIGHT: 274.03 LBS | TEMPERATURE: 98 F | OXYGEN SATURATION: 99 %

## 2022-09-06 DIAGNOSIS — B34.9 VIRAL SYNDROME: Primary | ICD-10-CM

## 2022-09-06 DIAGNOSIS — R11.2 NAUSEA VOMITING AND DIARRHEA: ICD-10-CM

## 2022-09-06 DIAGNOSIS — R51.9 HEADACHE: ICD-10-CM

## 2022-09-06 DIAGNOSIS — R19.7 NAUSEA VOMITING AND DIARRHEA: ICD-10-CM

## 2022-09-06 DIAGNOSIS — Z20.822 ENCOUNTER FOR LABORATORY TESTING FOR COVID-19 VIRUS: ICD-10-CM

## 2022-09-06 LAB — SARS-COV-2 RNA RESP QL NAA+PROBE: NEGATIVE

## 2022-09-06 PROCEDURE — U0005 INFEC AGEN DETEC AMPLI PROBE: HCPCS

## 2022-09-06 PROCEDURE — 99284 EMERGENCY DEPT VISIT MOD MDM: CPT

## 2022-09-06 PROCEDURE — U0003 INFECTIOUS AGENT DETECTION BY NUCLEIC ACID (DNA OR RNA); SEVERE ACUTE RESPIRATORY SYNDROME CORONAVIRUS 2 (SARS-COV-2) (CORONAVIRUS DISEASE [COVID-19]), AMPLIFIED PROBE TECHNIQUE, MAKING USE OF HIGH THROUGHPUT TECHNOLOGIES AS DESCRIBED BY CMS-2020-01-R: HCPCS

## 2022-09-06 PROCEDURE — 99283 EMERGENCY DEPT VISIT LOW MDM: CPT

## 2022-09-06 RX ORDER — IBUPROFEN 400 MG/1
400 TABLET ORAL EVERY 6 HOURS PRN
Qty: 30 TABLET | Refills: 0 | Status: SHIPPED | OUTPATIENT
Start: 2022-09-06 | End: 2022-09-06 | Stop reason: RX

## 2022-09-06 RX ORDER — ONDANSETRON 4 MG/1
4 TABLET, ORALLY DISINTEGRATING ORAL ONCE
Status: COMPLETED | OUTPATIENT
Start: 2022-09-06 | End: 2022-09-06

## 2022-09-06 RX ORDER — ACETAMINOPHEN 325 MG/1
650 TABLET ORAL ONCE
Status: COMPLETED | OUTPATIENT
Start: 2022-09-06 | End: 2022-09-06

## 2022-09-06 RX ORDER — ONDANSETRON 4 MG/1
4 TABLET, ORALLY DISINTEGRATING ORAL EVERY 6 HOURS PRN
Qty: 4 TABLET | Refills: 0 | Status: SHIPPED | OUTPATIENT
Start: 2022-09-06

## 2022-09-06 RX ORDER — ONDANSETRON 4 MG/1
4 TABLET, ORALLY DISINTEGRATING ORAL EVERY 6 HOURS PRN
Qty: 4 TABLET | Refills: 0 | Status: SHIPPED | OUTPATIENT
Start: 2022-09-06 | End: 2022-09-06 | Stop reason: RX

## 2022-09-06 RX ORDER — IBUPROFEN 400 MG/1
400 TABLET ORAL EVERY 6 HOURS PRN
Qty: 20 TABLET | Refills: 0 | Status: SHIPPED | OUTPATIENT
Start: 2022-09-06

## 2022-09-06 RX ORDER — SENNOSIDES 8.6 MG
650 CAPSULE ORAL EVERY 8 HOURS PRN
Qty: 30 TABLET | Refills: 0 | Status: SHIPPED | OUTPATIENT
Start: 2022-09-06 | End: 2022-09-06 | Stop reason: RX

## 2022-09-06 RX ORDER — SENNOSIDES 8.6 MG
650 CAPSULE ORAL EVERY 8 HOURS PRN
Qty: 30 TABLET | Refills: 0 | Status: SHIPPED | OUTPATIENT
Start: 2022-09-06

## 2022-09-06 RX ADMIN — ONDANSETRON 4 MG: 4 TABLET, ORALLY DISINTEGRATING ORAL at 13:42

## 2022-09-06 RX ADMIN — ACETAMINOPHEN 650 MG: 325 TABLET, FILM COATED ORAL at 13:42

## 2022-09-06 NOTE — ED PROVIDER NOTES
History  Chief Complaint   Patient presents with    Vomiting     States vomited x 3 and diarrhea x 1 last night into this morning; also headache; has feeling like a hang over, but didn't drink- requesting covid testing  44 y o  M with no reported PMH presents to ED c/o Vomiting x 1 this morning  Headache, myalgias  diarhhea x3  Brown, softer than normal  No abdominal pain  Is not vaccinated for covid  Headache is resolving with ibuprofen  Not exertional  Upon waking today  Similar to previous headaches  History provided by:  Patient  Flu Symptoms  Presenting symptoms: diarrhea, fatigue, headache, myalgias, rhinorrhea and vomiting    Presenting symptoms: no cough, no fever, no nausea, no shortness of breath and no sore throat    Headaches:     Severity:  Mild    Progression:  Partially resolved  Duration: since this morning   Progression:  Unchanged  Chronicity:  New  Relieved by:  Nothing  Worsened by:  Nothing  Ineffective treatments:  None tried  Associated symptoms: no chills, no decreased appetite, no decrease in physical activity, no ear pain, no mental status change, no congestion, no neck stiffness and no syncope    Risk factors: no immunocompromised state and no sick contacts        None       History reviewed  No pertinent past medical history  Past Surgical History:   Procedure Laterality Date    APPENDECTOMY         History reviewed  No pertinent family history  I have reviewed and agree with the history as documented  E-Cigarette/Vaping    E-Cigarette Use Never User      E-Cigarette/Vaping Substances     Social History     Tobacco Use    Smoking status: Never Smoker    Smokeless tobacco: Never Used   Vaping Use    Vaping Use: Never used   Substance Use Topics    Alcohol use: Yes     Comment: socially    Drug use: Yes     Types: Marijuana       Review of Systems   Constitutional: Positive for fatigue  Negative for chills, decreased appetite and fever     HENT: Positive for rhinorrhea  Negative for congestion, ear pain, sore throat and trouble swallowing  Eyes: Negative for photophobia, pain and visual disturbance  Respiratory: Negative for cough and shortness of breath  Cardiovascular: Negative for chest pain and leg swelling  Gastrointestinal: Positive for diarrhea and vomiting  Negative for abdominal distention, abdominal pain, blood in stool and nausea  Genitourinary: Negative for decreased urine volume, dysuria and hematuria  Musculoskeletal: Positive for myalgias  Negative for back pain, gait problem and neck stiffness  Skin: Negative for color change and rash  Neurological: Positive for headaches  Negative for dizziness, syncope, weakness, light-headedness and numbness  Psychiatric/Behavioral: Negative for confusion  All other systems reviewed and are negative  Physical Exam  Physical Exam  Vitals and nursing note reviewed  Constitutional:       General: He is awake  He is not in acute distress  Appearance: Normal appearance  He is not ill-appearing, toxic-appearing or diaphoretic  HENT:      Head: Normocephalic and atraumatic  Jaw: There is normal jaw occlusion  No swelling  Right Ear: Tympanic membrane, ear canal and external ear normal       Left Ear: Tympanic membrane, ear canal and external ear normal       Nose: Rhinorrhea present  Mouth/Throat:      Lips: Pink  Mouth: Mucous membranes are moist       Pharynx: Oropharynx is clear  No oropharyngeal exudate or posterior oropharyngeal erythema  Eyes:      General: Lids are normal  Vision grossly intact  Right eye: No discharge  Left eye: No discharge  Extraocular Movements: Extraocular movements intact  Conjunctiva/sclera: Conjunctivae normal       Pupils: Pupils are equal, round, and reactive to light  Neck:      Trachea: Phonation normal       Meningeal: Brudzinski's sign absent     Cardiovascular:      Rate and Rhythm: Normal rate and regular rhythm  Heart sounds: Normal heart sounds  Pulmonary:      Effort: Pulmonary effort is normal  No respiratory distress  Breath sounds: Normal breath sounds  Comments: Patient in no respiratory distress, speaking in full sentences, managing oral secretions without difficulty, no accessory muscle use, retractions, or belly breathing noted, no adventitious lung sounds auscultated bilaterally  Abdominal:      General: There is no distension  Palpations: Abdomen is soft  Tenderness: There is no abdominal tenderness  There is no right CVA tenderness or left CVA tenderness  Musculoskeletal:      Cervical back: Full passive range of motion without pain and neck supple  No rigidity  Right lower leg: No edema  Left lower leg: No edema  Lymphadenopathy:      Cervical: No cervical adenopathy  Skin:     General: Skin is warm and dry  Capillary Refill: Capillary refill takes less than 2 seconds  Coloration: Skin is not jaundiced or pale  Findings: No erythema or rash  Neurological:      General: No focal deficit present  Mental Status: He is alert and oriented to person, place, and time  Gait: Gait normal       Comments: GCS 15  AAOx4  No focal neuro deficits  CN II-XII intact  PERRL  EOMI  No pronator drift   strength 5/5 bilaterally  B/L UE strength 5/5 throughout  Finger to nose, heel shin, rapid alternating movements Cerebellar function normal  Ambulates without difficulty  B/L LE strength 5/5 throughout  Gross sensation to b/l upper and lower extremities intact        Psychiatric:         Mood and Affect: Mood normal          Behavior: Behavior normal  Behavior is cooperative  Thought Content:  Thought content normal          Vital Signs  ED Triage Vitals [09/06/22 1243]   Temperature Pulse Respirations Blood Pressure SpO2   98 °F (36 7 °C) 84 16 144/71 99 %      Temp Source Heart Rate Source Patient Position - Orthostatic VS BP Location FiO2 (%)   Oral Monitor Sitting Left arm --      Pain Score       7           Vitals:    09/06/22 1243   BP: 144/71   Pulse: 84   Patient Position - Orthostatic VS: Sitting         Visual Acuity      ED Medications  Medications   ondansetron (ZOFRAN-ODT) dispersible tablet 4 mg (4 mg Oral Given 9/6/22 1342)   acetaminophen (TYLENOL) tablet 650 mg (650 mg Oral Given 9/6/22 1342)       Diagnostic Studies  Results Reviewed     Procedure Component Value Units Date/Time    COVID only [[id number]]  (Normal) Collected: 09/06/22 1339    Lab Status: Final result Specimen: Nares from Nose Updated: 09/06/22 1436     SARS-CoV-2 Negative    Narrative:      FOR PEDIATRIC PATIENTS - copy/paste COVID Guidelines URL to browser: [email or web address]  [employer]    SARS-CoV-2 assay is a Nucleic Acid Amplification assay intended for the  qualitative detection of nucleic acid from SARS-CoV-2 in nasopharyngeal  swabs  Results are for the presumptive identification of SARS-CoV-2 RNA  Positive results are indicative of infection with SARS-CoV-2, the virus  causing COVID-19, but do not rule out bacterial infection or co-infection  with other viruses  Laboratories within the [place] and its  territories are required to report all positive results to the appropriate  public health authorities  Negative results do not preclude SARS-CoV-2  infection and should not be used as the sole basis for treatment or other  patient management decisions  Negative results must be combined with  clinical observations, patient history, and epidemiological information  This test has not been FDA cleared or approved  This test has been authorized by FDA under an Emergency Use Authorization  (EUA)   This test is only authorized for the duration of time the  declaration that circumstances exist justifying the authorization of the  emergency use of an in vitro diagnostic tests for detection of SARS-CoV-2  virus and/or diagnosis of COVID-19 infection under section 564(b)(1) of  the Act, 21 U  S C  299WJD-5(I)(5), unless the authorization is terminated  or revoked sooner  The test has been validated but independent review by FDA  and CLIA is pending  Test performed using [employer] GeneXpert: This RT-PCR assay targets N2,  a region unique to SARS-CoV-2  A conserved region in the E-gene was chosen  for pan-Sarbecovirus detection which includes SARS-CoV-2  No orders to display              Procedures  Procedures         ED Course  ED Course as of 09/06/22 1652   Tue Sep 06, 2022   1346 Patient tolerating PO water without difficulty, even prior to ondansetron  1355 Patient was reexamined at this time and informed of laboratory and/or imaging results and was found to be stable for discharge  Return to emergency department criteria was reviewed with the patient who verbalized understanding and was agreeable to discharge and the treatment plan at this time  MDM  Number of Diagnoses or Management Options  Encounter for laboratory testing for COVID-19 virus  Headache  Nausea vomiting and diarrhea  Viral syndrome  Diagnosis management comments: VSS  Afebrile  Benign abdominal exam  No focal neuro deficits  No meningeal signs  No acute respiratory distress  Clinically well-hydrated  Tolerating PO without difficulty  Headache improving, Headache was not acute or maximal in onset  Headache similar to previous headaches  Do not suspect SAH, temporal arteritis, meningitis, encephalitis, CO poisoning, acute angle closure glaucoma, dural venous sinus thrombosis as cause of headache  Do not feel that further imaging or workup (including LP) are warranted at this time  Constellation of symptoms likely uncomplicated viral syndrome  Will test for Covid-19  Plan for supportive care at home   Patient will follow up with PCP and return to ED if symptoms worsen or persist      All imaging and/or lab testing discussed with patient, strict return to ED precautions discussed  Patient recommended to follow up promptly with appropriate outpatient provider  Patient and/or family members verbalizes understanding and agrees with plan  Patient and/or family members were given opportunity to ask questions, all questions were answered at this time  Patient is stable for discharge      Portions of the record may have been created with voice recognition software  Occasional wrong word or "sound a like" substitutions may have occurred due to the inherent limitations of voice recognition software  Read the chart carefully and recognize, using context, where substitutions have occurred  Disposition  Final diagnoses:   Viral syndrome   Encounter for laboratory testing for COVID-19 virus   Nausea vomiting and diarrhea   Headache     Time reflects when diagnosis was documented in both MDM as applicable and the Disposition within this note     Time User Action Codes Description Comment    9/6/2022  1:41 PM Charley Hpoe Add [B34 9] Viral syndrome     9/6/2022  1:41 PM Charley Hope Add [Z20 822] Encounter for laboratory testing for COVID-19 virus     9/6/2022  1:42 PM Antoinette Knox [R11 2,  R19 7] Nausea vomiting and diarrhea     9/6/2022  1:42 PM Charley Hope Add [R51 9] Headache       ED Disposition     ED Disposition   Discharge    Condition   Stable    Date/Time   Tue Sep 6, 2022  1:47 PM    Comment   Manda Hunter discharge to home/self care                 Follow-up Information     Follow up With Specialties Details Why Contact Info Additional [facility] Schedule an appointment as soon as possible for a visit  For follow up regarding your symptoms [address], [address] [facility] [id number]  [address], [address], [address] [facility], [place], [address]          Discharge Medication List as of 9/6/2022  1:55 PM      START taking these medications    Details   acetaminophen (TYLENOL) 650 mg CR tablet Take 1 tablet (650 mg total) by mouth every 8 (eight) hours as needed for mild pain, Starting Tue 9/6/2022, Normal      ibuprofen (MOTRIN) 400 mg tablet Take 1 tablet (400 mg total) by mouth every 6 (six) hours as needed for mild pain, moderate pain or headaches, Starting Tue 9/6/2022, Normal      ondansetron (ZOFRAN-ODT) 4 mg disintegrating tablet Take 1 tablet (4 mg total) by mouth every 6 (six) hours as needed for vomiting for up to 4 doses, Starting Tue 9/6/2022, Normal                 PDMP Review     None          ED Provider  Electronically Signed by           Charmayne Honey, PA-C  09/06/22 6926

## 2022-09-06 NOTE — DISCHARGE INSTRUCTIONS
We will call you with the results of your COVID-19 test  They will also be available on your MyChart  Stay home until the results are received, then follow the appropriate CDC quarantine/isolation guidelines based on your vaccinations status and symptoms  Follow up with your Primary Care Provider  Stay hydrated  Take medications as needed for symptoms  Return to ED for new or worsening symptoms as discussed